# Patient Record
Sex: FEMALE | Race: WHITE | Employment: UNEMPLOYED | URBAN - METROPOLITAN AREA
[De-identification: names, ages, dates, MRNs, and addresses within clinical notes are randomized per-mention and may not be internally consistent; named-entity substitution may affect disease eponyms.]

---

## 2021-06-25 LAB
ABO, EXTERNAL RESULT: NORMAL
HEP B, EXTERNAL RESULT: NEGATIVE
HEPATITIS C ANTIBODY, EXTERNAL RESULT: NEGATIVE
HIV, EXTERNAL RESULT: NEGATIVE
RH FACTOR, EXTERNAL RESULT: POSITIVE
RPR, EXTERNAL RESULT: NONREACTIVE
RUBELLA TITER, EXTERNAL RESULT: NORMAL

## 2021-10-01 ENCOUNTER — HOSPITAL ENCOUNTER (OUTPATIENT)
Age: 36
Discharge: HOME | End: 2021-10-01
Attending: STUDENT IN AN ORGANIZED HEALTH CARE EDUCATION/TRAINING PROGRAM | Admitting: STUDENT IN AN ORGANIZED HEALTH CARE EDUCATION/TRAINING PROGRAM
Payer: MEDICAID

## 2021-10-01 VITALS
WEIGHT: 290 LBS | DIASTOLIC BLOOD PRESSURE: 60 MMHG | SYSTOLIC BLOOD PRESSURE: 116 MMHG | RESPIRATION RATE: 18 BRPM | HEIGHT: 66 IN | TEMPERATURE: 98.5 F | BODY MASS INDEX: 46.61 KG/M2 | HEART RATE: 86 BPM

## 2021-10-01 LAB
BILIRUBIN URINE: NEGATIVE
BLOOD, URINE: NEGATIVE
CLARITY: CLEAR
COLOR: YELLOW
EPITHELIAL CELLS, UA: NORMAL /HPF (ref 0–5)
GLUCOSE URINE: NEGATIVE MG/DL
KETONES, URINE: 40 MG/DL
LEUKOCYTE ESTERASE, URINE: ABNORMAL
MICROSCOPIC EXAMINATION: YES
NITRITE, URINE: NEGATIVE
PH UA: 6 (ref 5–8)
PROTEIN UA: NEGATIVE MG/DL
RBC UA: NORMAL /HPF (ref 0–4)
SPECIFIC GRAVITY UA: 1.02 (ref 1–1.03)
URINE TYPE: ABNORMAL
UROBILINOGEN, URINE: 0.2 E.U./DL
WBC UA: NORMAL /HPF (ref 0–5)

## 2021-10-01 PROCEDURE — 99211 OFF/OP EST MAY X REQ PHY/QHP: CPT

## 2021-10-01 PROCEDURE — 81001 URINALYSIS AUTO W/SCOPE: CPT

## 2021-10-01 NOTE — PROGRESS NOTES
Pt verbalized understanding of verbal and written discharge instructions and denies having questions at this time. Pt left OB unit at 1717 ambulatory, undelivered, and in stable condition, accompanied by FOB. Patient is not in active labor.

## 2021-10-01 NOTE — PROGRESS NOTES
This RN notified Dr Carmella Johns of pt arrival and complaints. Pt did state that she does not feel like she is emptying her bladder all the way.  Orders to send UA

## 2021-12-20 ENCOUNTER — HOSPITAL ENCOUNTER (OUTPATIENT)
Dept: ULTRASOUND IMAGING | Age: 36
Discharge: HOME | End: 2021-12-20
Payer: MEDICAID

## 2021-12-20 LAB — GBS, EXTERNAL RESULT: NEGATIVE

## 2021-12-20 PROCEDURE — 76815 OB US LIMITED FETUS(S): CPT

## 2021-12-29 ENCOUNTER — HOSPITAL ENCOUNTER (OUTPATIENT)
Age: 36
Discharge: HOME | End: 2021-12-29
Payer: MEDICAID

## 2021-12-29 PROCEDURE — U0005 INFEC AGEN DETEC AMPLI PROBE: HCPCS

## 2021-12-29 PROCEDURE — U0003 INFECTIOUS AGENT DETECTION BY NUCLEIC ACID (DNA OR RNA); SEVERE ACUTE RESPIRATORY SYNDROME CORONAVIRUS 2 (SARS-COV-2) (CORONAVIRUS DISEASE [COVID-19]), AMPLIFIED PROBE TECHNIQUE, MAKING USE OF HIGH THROUGHPUT TECHNOLOGIES AS DESCRIBED BY CMS-2020-01-R: HCPCS

## 2021-12-30 LAB — SARS-COV-2: NOT DETECTED

## 2022-01-22 ENCOUNTER — ANESTHESIA (OUTPATIENT)
Dept: LABOR AND DELIVERY | Age: 37
DRG: 560 | End: 2022-01-22
Payer: MEDICAID

## 2022-01-22 ENCOUNTER — HOSPITAL ENCOUNTER (INPATIENT)
Age: 37
LOS: 2 days | Discharge: HOME | DRG: 560 | End: 2022-01-24
Attending: OBSTETRICS & GYNECOLOGY | Admitting: OBSTETRICS & GYNECOLOGY
Payer: MEDICAID

## 2022-01-22 ENCOUNTER — APPOINTMENT (OUTPATIENT)
Dept: LABOR AND DELIVERY | Age: 37
DRG: 560 | End: 2022-01-22
Payer: MEDICAID

## 2022-01-22 ENCOUNTER — ANESTHESIA EVENT (OUTPATIENT)
Dept: LABOR AND DELIVERY | Age: 37
DRG: 560 | End: 2022-01-22
Payer: MEDICAID

## 2022-01-22 PROBLEM — O48.0 POST-DATES PREGNANCY: Status: ACTIVE | Noted: 2022-01-22

## 2022-01-22 PROBLEM — O48.0 POST-TERM PREGNANCY, 40-42 WEEKS OF GESTATION: Status: ACTIVE | Noted: 2022-01-22

## 2022-01-22 LAB
ABO/RH: NORMAL
AMPHETAMINE SCREEN, URINE: NORMAL
ANTIBODY SCREEN: NORMAL
BARBITURATE SCREEN URINE: NORMAL
BASOPHILS ABSOLUTE: 0 K/UL (ref 0–0.2)
BASOPHILS RELATIVE PERCENT: 0.3 %
BENZODIAZEPINE SCREEN, URINE: NORMAL
BUPRENORPHINE URINE: NORMAL
CANNABINOID SCREEN URINE: NORMAL
COCAINE METABOLITE SCREEN URINE: NORMAL
EOSINOPHILS ABSOLUTE: 0.1 K/UL (ref 0–0.6)
EOSINOPHILS RELATIVE PERCENT: 0.7 %
HCT VFR BLD CALC: 35.5 % (ref 36–48)
HEMOGLOBIN: 11.8 G/DL (ref 12–16)
LYMPHOCYTES ABSOLUTE: 1.1 K/UL (ref 1–5.1)
LYMPHOCYTES RELATIVE PERCENT: 12.2 %
Lab: NORMAL
MCH RBC QN AUTO: 30.5 PG (ref 26–34)
MCHC RBC AUTO-ENTMCNC: 33.3 G/DL (ref 31–36)
MCV RBC AUTO: 91.8 FL (ref 80–100)
METHADONE SCREEN, URINE: NORMAL
MONOCYTES ABSOLUTE: 0.6 K/UL (ref 0–1.3)
MONOCYTES RELATIVE PERCENT: 7.3 %
NEUTROPHILS ABSOLUTE: 6.9 K/UL (ref 1.7–7.7)
NEUTROPHILS RELATIVE PERCENT: 79.5 %
OPIATE SCREEN URINE: NORMAL
OXYCODONE URINE: NORMAL
PDW BLD-RTO: 16.3 % (ref 12.4–15.4)
PH UA: 6
PHENCYCLIDINE SCREEN URINE: NORMAL
PLATELET # BLD: 208 K/UL (ref 135–450)
PMV BLD AUTO: 10.2 FL (ref 5–10.5)
PROPOXYPHENE SCREEN: NORMAL
RBC # BLD: 3.86 M/UL (ref 4–5.2)
WBC # BLD: 8.7 K/UL (ref 4–11)

## 2022-01-22 PROCEDURE — 6370000000 HC RX 637 (ALT 250 FOR IP): Performed by: OBSTETRICS & GYNECOLOGY

## 2022-01-22 PROCEDURE — 51701 INSERT BLADDER CATHETER: CPT

## 2022-01-22 PROCEDURE — 86592 SYPHILIS TEST NON-TREP QUAL: CPT

## 2022-01-22 PROCEDURE — 85025 COMPLETE CBC W/AUTO DIFF WBC: CPT

## 2022-01-22 PROCEDURE — 80307 DRUG TEST PRSMV CHEM ANLYZR: CPT

## 2022-01-22 PROCEDURE — 86901 BLOOD TYPING SEROLOGIC RH(D): CPT

## 2022-01-22 PROCEDURE — 1220000000 HC SEMI PRIVATE OB R&B

## 2022-01-22 PROCEDURE — 2580000003 HC RX 258: Performed by: OBSTETRICS & GYNECOLOGY

## 2022-01-22 PROCEDURE — 3700000025 EPIDURAL BLOCK: Performed by: ANESTHESIOLOGY

## 2022-01-22 PROCEDURE — 86900 BLOOD TYPING SEROLOGIC ABO: CPT

## 2022-01-22 PROCEDURE — 86850 RBC ANTIBODY SCREEN: CPT

## 2022-01-22 PROCEDURE — 2500000003 HC RX 250 WO HCPCS: Performed by: NURSE ANESTHETIST, CERTIFIED REGISTERED

## 2022-01-22 PROCEDURE — 6370000000 HC RX 637 (ALT 250 FOR IP)

## 2022-01-22 RX ORDER — SODIUM CHLORIDE, SODIUM LACTATE, POTASSIUM CHLORIDE, CALCIUM CHLORIDE 600; 310; 30; 20 MG/100ML; MG/100ML; MG/100ML; MG/100ML
INJECTION, SOLUTION INTRAVENOUS CONTINUOUS
Status: DISCONTINUED | OUTPATIENT
Start: 2022-01-22 | End: 2022-01-23

## 2022-01-22 RX ORDER — SODIUM CHLORIDE, SODIUM LACTATE, POTASSIUM CHLORIDE, AND CALCIUM CHLORIDE .6; .31; .03; .02 G/100ML; G/100ML; G/100ML; G/100ML
1000 INJECTION, SOLUTION INTRAVENOUS PRN
Status: DISCONTINUED | OUTPATIENT
Start: 2022-01-22 | End: 2022-01-23

## 2022-01-22 RX ORDER — LIDOCAINE HYDROCHLORIDE 10 MG/ML
30 INJECTION, SOLUTION EPIDURAL; INFILTRATION; INTRACAUDAL; PERINEURAL PRN
Status: DISCONTINUED | OUTPATIENT
Start: 2022-01-22 | End: 2022-01-23

## 2022-01-22 RX ORDER — SODIUM CHLORIDE 9 MG/ML
25 INJECTION, SOLUTION INTRAVENOUS PRN
Status: DISCONTINUED | OUTPATIENT
Start: 2022-01-22 | End: 2022-01-23

## 2022-01-22 RX ORDER — SODIUM CHLORIDE, SODIUM LACTATE, POTASSIUM CHLORIDE, AND CALCIUM CHLORIDE .6; .31; .03; .02 G/100ML; G/100ML; G/100ML; G/100ML
500 INJECTION, SOLUTION INTRAVENOUS PRN
Status: DISCONTINUED | OUTPATIENT
Start: 2022-01-22 | End: 2022-01-23

## 2022-01-22 RX ORDER — SODIUM CHLORIDE, SODIUM LACTATE, POTASSIUM CHLORIDE, CALCIUM CHLORIDE 600; 310; 30; 20 MG/100ML; MG/100ML; MG/100ML; MG/100ML
INJECTION, SOLUTION INTRAVENOUS CONTINUOUS
Status: DISCONTINUED | OUTPATIENT
Start: 2022-01-22 | End: 2022-01-22

## 2022-01-22 RX ORDER — LIDOCAINE HYDROCHLORIDE AND EPINEPHRINE BITARTRATE 20; .01 MG/ML; MG/ML
INJECTION, SOLUTION SUBCUTANEOUS PRN
Status: DISCONTINUED | OUTPATIENT
Start: 2022-01-22 | End: 2022-01-22 | Stop reason: SDUPTHER

## 2022-01-22 RX ORDER — DIPHENHYDRAMINE HYDROCHLORIDE 50 MG/ML
25 INJECTION INTRAMUSCULAR; INTRAVENOUS EVERY 4 HOURS PRN
Status: DISCONTINUED | OUTPATIENT
Start: 2022-01-22 | End: 2022-01-23

## 2022-01-22 RX ORDER — BUPIVACAINE HYDROCHLORIDE 2.5 MG/ML
INJECTION, SOLUTION EPIDURAL; INFILTRATION; INTRACAUDAL PRN
Status: DISCONTINUED | OUTPATIENT
Start: 2022-01-22 | End: 2022-01-22 | Stop reason: SDUPTHER

## 2022-01-22 RX ORDER — SODIUM CHLORIDE 0.9 % (FLUSH) 0.9 %
5-40 SYRINGE (ML) INJECTION PRN
Status: DISCONTINUED | OUTPATIENT
Start: 2022-01-22 | End: 2022-01-23

## 2022-01-22 RX ORDER — ONDANSETRON 2 MG/ML
4 INJECTION INTRAMUSCULAR; INTRAVENOUS EVERY 6 HOURS PRN
Status: CANCELLED | OUTPATIENT
Start: 2022-01-22

## 2022-01-22 RX ORDER — ACETAMINOPHEN 325 MG/1
650 TABLET ORAL EVERY 4 HOURS PRN
Status: DISCONTINUED | OUTPATIENT
Start: 2022-01-22 | End: 2022-01-23

## 2022-01-22 RX ORDER — SODIUM CHLORIDE 0.9 % (FLUSH) 0.9 %
5-40 SYRINGE (ML) INJECTION EVERY 12 HOURS SCHEDULED
Status: DISCONTINUED | OUTPATIENT
Start: 2022-01-22 | End: 2022-01-23

## 2022-01-22 RX ORDER — BUTORPHANOL TARTRATE 1 MG/ML
1 INJECTION, SOLUTION INTRAMUSCULAR; INTRAVENOUS
Status: DISCONTINUED | OUTPATIENT
Start: 2022-01-22 | End: 2022-01-23

## 2022-01-22 RX ORDER — ONDANSETRON 2 MG/ML
4 INJECTION INTRAMUSCULAR; INTRAVENOUS EVERY 6 HOURS PRN
Status: DISCONTINUED | OUTPATIENT
Start: 2022-01-22 | End: 2022-01-23

## 2022-01-22 RX ADMIN — BUPIVACAINE HYDROCHLORIDE 8 MG: 2.5 INJECTION, SOLUTION EPIDURAL; INFILTRATION; INTRACAUDAL; PERINEURAL at 18:37

## 2022-01-22 RX ADMIN — SODIUM CHLORIDE, POTASSIUM CHLORIDE, SODIUM LACTATE AND CALCIUM CHLORIDE 1000 ML: 600; 310; 30; 20 INJECTION, SOLUTION INTRAVENOUS at 18:27

## 2022-01-22 RX ADMIN — Medication 500 ML: at 23:09

## 2022-01-22 RX ADMIN — Medication 25 MCG: at 14:21

## 2022-01-22 RX ADMIN — Medication 25 MCG: at 09:45

## 2022-01-22 RX ADMIN — LIDOCAINE HYDROCHLORIDE AND EPINEPHRINE 3 ML: 20; 10 INJECTION, SOLUTION INFILTRATION; PERINEURAL at 18:33

## 2022-01-22 RX ADMIN — SODIUM CHLORIDE, POTASSIUM CHLORIDE, SODIUM LACTATE AND CALCIUM CHLORIDE: 600; 310; 30; 20 INJECTION, SOLUTION INTRAVENOUS at 19:28

## 2022-01-22 RX ADMIN — SODIUM CHLORIDE, POTASSIUM CHLORIDE, SODIUM LACTATE AND CALCIUM CHLORIDE: 600; 310; 30; 20 INJECTION, SOLUTION INTRAVENOUS at 20:53

## 2022-01-22 RX ADMIN — SODIUM CHLORIDE, POTASSIUM CHLORIDE, SODIUM LACTATE AND CALCIUM CHLORIDE 1000 ML: 600; 310; 30; 20 INJECTION, SOLUTION INTRAVENOUS at 18:35

## 2022-01-22 RX ADMIN — Medication 15 ML/HR: at 19:22

## 2022-01-22 NOTE — H&P
Department of Obstetrics and Gynecology   Obstetrics History and Physical        CHIEF COMPLAINT:  IOL postdates    HISTORY OF PRESENT ILLNESS:      The patient is a 29 y.o. female at 44w3d. OB History        4    Para   3    Term   3       0    AB   0    Living   4       SAB   0    IAB   0    Ectopic   0    Molar   0    Multiple   1    Live Births   4            Patient presents with a chief complaint as above and is being admitted for induction    Estimated Due Date: Estimated Date of Delivery: 22    PRENATAL CARE:    Complicated by: post-dates    PAST OB HISTORY:  OB History        4    Para   3    Term   3       0    AB   0    Living   4       SAB   0    IAB   0    Ectopic   0    Molar   0    Multiple   1    Live Births   4                Past Medical History:        Diagnosis Date    Skin sore     pt has raised \"boils\" on abdomen, states not MRSA, one is reddened, all others are scarred    Thyroid disease      Past Surgical History:    History reviewed. No pertinent surgical history.   Allergies:  Bactrim [sulfamethoxazole-trimethoprim] and Flagyl [metronidazole]    Social History:    Social History     Socioeconomic History    Marital status:      Spouse name: Not on file    Number of children: Not on file    Years of education: Not on file    Highest education level: Not on file   Occupational History    Not on file   Tobacco Use    Smoking status: Former Smoker     Packs/day: 1.00     Years: 9.00     Pack years: 9.00     Types: Cigarettes    Smokeless tobacco: Never Used   Vaping Use    Vaping Use: Not on file   Substance and Sexual Activity    Alcohol use: No    Drug use: No    Sexual activity: Yes     Partners: Male   Other Topics Concern    Not on file   Social History Narrative    Not on file     Social Determinants of Health     Financial Resource Strain:     Difficulty of Paying Living Expenses: Not on file   Food Insecurity:     Worried About Running Out of Food in the Last Year: Not on file    Ran Out of Food in the Last Year: Not on file   Transportation Needs:     Lack of Transportation (Medical): Not on file    Lack of Transportation (Non-Medical): Not on file   Physical Activity:     Days of Exercise per Week: Not on file    Minutes of Exercise per Session: Not on file   Stress:     Feeling of Stress : Not on file   Social Connections:     Frequency of Communication with Friends and Family: Not on file    Frequency of Social Gatherings with Friends and Family: Not on file    Attends Evangelical Services: Not on file    Active Member of [address] [place] [address] [employer] or Organizations: Not on file    Attends Club or Organization Meetings: Not on file    Marital Status: Not on file   Intimate Partner Violence:     Fear of Current or Ex-Partner: Not on file    Emotionally Abused: Not on file    Physically Abused: Not on file    Sexually Abused: Not on file   Housing Stability:     Unable to Pay for Housing in the Last Year: Not on file    Number of [place] in the Last Year: Not on file    Unstable Housing in the Last Year: Not on file     Family History:   History reviewed. No pertinent family history. Medications Prior to Admission:  Medications Prior to Admission: Prenatal MV-Min-Fe Fum-FA-DHA (PRENATAL 1 PO), Take by mouth  levothyroxine (SYNTHROID) 88 MCG tablet, Take 150 mcg by mouth Daily     REVIEW OF SYSTEMS:    wnl    PHYSICAL EXAM:  Vitals:    01/22/22 0906   Weight: (!) 308 lb (139.7 kg)   Height: 5' 6\" (1.676 m)     General appearance:  awake, alert, cooperative, no apparent distress, and appears stated age  Neurologic:  Awake, alert, oriented to name, place and time. Lungs:  No increased work of breathing, good air exchange  Abdomen:  Soft, non tender, gravid, consistent with her gestational age, EFW by Leopold's maneuver was 8#   Fetal heart rate:  Reassuring.   Pelvis:  Adequate pelvis  Cervix: 2-3/50%/-2  Contraction frequency: irregular  Membranes:  Intact    Labs:   CBC with Differential:    Lab Results   Component Value Date    WBC 8.7 01/22/2022    RBC 3.86 01/22/2022    HGB 11.8 01/22/2022    HCT 35.5 01/22/2022     01/22/2022    MCV 91.8 01/22/2022    MCH 30.5 01/22/2022    MCHC 33.3 01/22/2022    RDW 16.3 01/22/2022    LYMPHOPCT 12.2 01/22/2022    MONOPCT 7.3 01/22/2022    BASOPCT 0.3 01/22/2022    MONOSABS 0.6 01/22/2022    LYMPHSABS 1.1 01/22/2022    EOSABS 0.1 01/22/2022    BASOSABS 0.0 01/22/2022       ASSESSMENT AND PLAN:    Labor: Admit, anticipate normal delivery, routine labor orders  Fetus: Reassuring  GBS: No    Post-dates IOL  Cytotec  NCB-expect uncomplicated L and D    MYESHA Flores

## 2022-01-23 LAB
HCT VFR BLD CALC: 32.6 % (ref 36–48)
HEMOGLOBIN: 10.6 G/DL (ref 12–16)
MCH RBC QN AUTO: 31.1 PG (ref 26–34)
MCHC RBC AUTO-ENTMCNC: 32.6 G/DL (ref 31–36)
MCV RBC AUTO: 95.3 FL (ref 80–100)
PDW BLD-RTO: 16.7 % (ref 12.4–15.4)
PLATELET # BLD: 186 K/UL (ref 135–450)
PMV BLD AUTO: 9.2 FL (ref 5–10.5)
RBC # BLD: 3.43 M/UL (ref 4–5.2)
RPR: NORMAL
WBC # BLD: 8.1 K/UL (ref 4–11)

## 2022-01-23 PROCEDURE — 10907ZC DRAINAGE OF AMNIOTIC FLUID, THERAPEUTIC FROM PRODUCTS OF CONCEPTION, VIA NATURAL OR ARTIFICIAL OPENING: ICD-10-PCS | Performed by: OBSTETRICS & GYNECOLOGY

## 2022-01-23 PROCEDURE — 85027 COMPLETE CBC AUTOMATED: CPT

## 2022-01-23 PROCEDURE — 7200000001 HC VAGINAL DELIVERY

## 2022-01-23 PROCEDURE — 6370000000 HC RX 637 (ALT 250 FOR IP): Performed by: OBSTETRICS & GYNECOLOGY

## 2022-01-23 PROCEDURE — 6360000002 HC RX W HCPCS: Performed by: OBSTETRICS & GYNECOLOGY

## 2022-01-23 PROCEDURE — 3E0DXGC INTRODUCTION OF OTHER THERAPEUTIC SUBSTANCE INTO MOUTH AND PHARYNX, EXTERNAL APPROACH: ICD-10-PCS | Performed by: OBSTETRICS & GYNECOLOGY

## 2022-01-23 PROCEDURE — 1220000000 HC SEMI PRIVATE OB R&B

## 2022-01-23 PROCEDURE — 36415 COLL VENOUS BLD VENIPUNCTURE: CPT

## 2022-01-23 RX ORDER — SIMETHICONE 80 MG
80 TABLET,CHEWABLE ORAL EVERY 6 HOURS PRN
Status: DISCONTINUED | OUTPATIENT
Start: 2022-01-23 | End: 2022-01-24 | Stop reason: HOSPADM

## 2022-01-23 RX ORDER — SODIUM CHLORIDE 9 MG/ML
25 INJECTION, SOLUTION INTRAVENOUS PRN
Status: DISCONTINUED | OUTPATIENT
Start: 2022-01-23 | End: 2022-01-24 | Stop reason: HOSPADM

## 2022-01-23 RX ORDER — SODIUM CHLORIDE 0.9 % (FLUSH) 0.9 %
5-40 SYRINGE (ML) INJECTION EVERY 12 HOURS SCHEDULED
Status: DISCONTINUED | OUTPATIENT
Start: 2022-01-23 | End: 2022-01-24 | Stop reason: HOSPADM

## 2022-01-23 RX ORDER — IBUPROFEN 800 MG/1
800 TABLET ORAL EVERY 6 HOURS PRN
Status: DISCONTINUED | OUTPATIENT
Start: 2022-01-23 | End: 2022-01-24 | Stop reason: HOSPADM

## 2022-01-23 RX ORDER — LANOLIN 100 %
OINTMENT (GRAM) TOPICAL PRN
Status: DISCONTINUED | OUTPATIENT
Start: 2022-01-23 | End: 2022-01-24 | Stop reason: HOSPADM

## 2022-01-23 RX ORDER — ACETAMINOPHEN 325 MG/1
650 TABLET ORAL EVERY 4 HOURS PRN
Status: DISCONTINUED | OUTPATIENT
Start: 2022-01-23 | End: 2022-01-24 | Stop reason: HOSPADM

## 2022-01-23 RX ORDER — SODIUM CHLORIDE, SODIUM LACTATE, POTASSIUM CHLORIDE, CALCIUM CHLORIDE 600; 310; 30; 20 MG/100ML; MG/100ML; MG/100ML; MG/100ML
INJECTION, SOLUTION INTRAVENOUS CONTINUOUS
Status: DISCONTINUED | OUTPATIENT
Start: 2022-01-23 | End: 2022-01-24 | Stop reason: HOSPADM

## 2022-01-23 RX ORDER — DOCUSATE SODIUM 100 MG/1
100 CAPSULE, LIQUID FILLED ORAL 2 TIMES DAILY PRN
Status: DISCONTINUED | OUTPATIENT
Start: 2022-01-23 | End: 2022-01-24 | Stop reason: HOSPADM

## 2022-01-23 RX ORDER — FERROUS SULFATE 325(65) MG
325 TABLET ORAL 2 TIMES DAILY WITH MEALS
Status: DISCONTINUED | OUTPATIENT
Start: 2022-01-23 | End: 2022-01-24 | Stop reason: HOSPADM

## 2022-01-23 RX ORDER — ONDANSETRON 2 MG/ML
4 INJECTION INTRAMUSCULAR; INTRAVENOUS EVERY 6 HOURS PRN
Status: DISCONTINUED | OUTPATIENT
Start: 2022-01-23 | End: 2022-01-24 | Stop reason: HOSPADM

## 2022-01-23 RX ORDER — SODIUM CHLORIDE 0.9 % (FLUSH) 0.9 %
5-40 SYRINGE (ML) INJECTION PRN
Status: DISCONTINUED | OUTPATIENT
Start: 2022-01-23 | End: 2022-01-24 | Stop reason: HOSPADM

## 2022-01-23 RX ORDER — KETOROLAC TROMETHAMINE 30 MG/ML
30 INJECTION, SOLUTION INTRAMUSCULAR; INTRAVENOUS EVERY 6 HOURS
Status: DISPENSED | OUTPATIENT
Start: 2022-01-23 | End: 2022-01-23

## 2022-01-23 RX ADMIN — ACETAMINOPHEN 650 MG: 325 TABLET ORAL at 15:47

## 2022-01-23 RX ADMIN — KETOROLAC TROMETHAMINE 30 MG: 30 INJECTION, SOLUTION INTRAMUSCULAR at 05:33

## 2022-01-23 RX ADMIN — DOCUSATE SODIUM 100 MG: 100 CAPSULE ORAL at 08:53

## 2022-01-23 RX ADMIN — IBUPROFEN 800 MG: 800 TABLET, FILM COATED ORAL at 23:10

## 2022-01-23 RX ADMIN — IBUPROFEN 800 MG: 800 TABLET, FILM COATED ORAL at 11:37

## 2022-01-23 RX ADMIN — ACETAMINOPHEN 650 MG: 325 TABLET ORAL at 08:53

## 2022-01-23 ASSESSMENT — PAIN SCALES - GENERAL
PAINLEVEL_OUTOF10: 3
PAINLEVEL_OUTOF10: 3

## 2022-01-23 NOTE — ANESTHESIA POSTPROCEDURE EVALUATION
Department of Anesthesiology  Postprocedure Note    Patient: Sissy Lino  MRN: [id number]  YOB: 1985  Date of evaluation: 1/23/2022  Time:  2:50 PM     Procedure Summary     Date: 01/22/22 Room / Location:     Anesthesia Start: 1825 Anesthesia Stop: 2306    Procedure: Labor Analgesia Diagnosis:     Scheduled Providers:  Responsible Provider: Nel Hoffman MD    Anesthesia Type: epidural ASA Status: 3          Anesthesia Type: epidural    Darell Phase I: Darell Score: 9    Darell Phase II: Darell Score: 10    Last vitals: Reviewed and per EMR flowsheets.        Anesthesia Post Evaluation    Patient location during evaluation: bedside  Patient participation: complete - patient participated  Level of consciousness: awake and alert  Nausea & Vomiting: no nausea and no vomiting  Complications: no  Cardiovascular status: hemodynamically stable  Hydration status: stable

## 2022-01-23 NOTE — ANESTHESIA PRE PROCEDURE
Department of Anesthesiology  Preprocedure Note       Name:  Kianna Gustafson   Age:  29 y.o.  :  1985                                          MRN:  [id number]         Date:  2022      Surgeon: Dr. Andreia Workman  Procedure: PHILLIP  Medications prior to admission:   Prior to Admission medications    Medication Sig Start Date End Date Taking?  Authorizing Provider   Prenatal MV-Min-Fe Fum-FA-DHA (PRENATAL 1 PO) Take by mouth    Historical Provider, MD   levothyroxine (SYNTHROID) 88 MCG tablet Take 150 mcg by mouth Daily     Historical Provider, MD       Current medications:    Current Facility-Administered Medications   Medication Dose Route Frequency Provider Last Rate Last Admin    lactated ringers infusion   IntraVENous Continuous Nilton Larkin MD        lactated ringers infusion   IntraVENous Continuous Nilton Larkin MD        lactated ringers bolus  500 mL IntraVENous PRN Nilton Larkin MD        Or    lactated ringers bolus  1,000 mL IntraVENous PRN Nilton Larkin MD        sodium chloride flush 0.9 % injection 5-40 mL  5-40 mL IntraVENous 2 times per day Nilton Larkin MD        sodium chloride flush 0.9 % injection 5-40 mL  5-40 mL IntraVENous PRN Nilton Larkin MD        0.9 % sodium chloride infusion  25 mL IntraVENous PRN Nilton Larkin MD        oxytocin (PITOCIN) 30 units in 500 mL infusion  87.3 lamine-units/min IntraVENous Continuous PRKEEGAN Larkin MD        And    oxytocin (PITOCIN) 10 unit bolus from the bag  10 Units IntraVENous RYAN Larkin MD        lactated ringers bolus  500 mL IntraVENous PRN Nilton Larkin MD        Or    lactated ringers bolus  1,000 mL IntraVENous PRN Nilton Larkin  mL/hr at 22 1835 1,000 mL at 22    sodium chloride flush 0.9 % injection 5-40 mL  5-40 mL IntraVENous 2 times per day Nilton Larkin MD        sodium chloride flush 0.9 % injection 5-40 mL 5-40 mL IntraVENous PRN Vinnie Waterman MD        0.9 % sodium chloride infusion  25 mL IntraVENous PRN Vinnie Waterman MD        lidocaine PF 1 % injection 30 mL  30 mL Other PRN Vinnie Waterman MD        ondansetron [facility] injection 4 mg  4 mg IntraVENous Q6H PRN Vinnie Waterman MD        diphenhydrAMINE (BENADRYL) injection 25 mg  25 mg IntraVENous Q4H PRN Vinnie Waterman MD        acetaminophen (TYLENOL) tablet 650 mg  650 mg Oral Q4H PRN Vinnie Waterman MD        miSOPROStol (CYTOTEC) pre-split tablet TABS 25 mcg  25 mcg Vaginal Q4H Vinnie Waterman MD   25 mcg at 01/22/22 1421    butorphanol (STADOL) injection 1 mg  1 mg IntraVENous Q3H PRN Vinnie Waterman MD        sodium chloride 0.9 % 200 mL with fentaNYL 500 mcg, bupivacaine 0.5% 50 mL (OB) epidural                Allergies: Allergies   Allergen Reactions    Bactrim [Sulfamethoxazole-Trimethoprim] Rash    Flagyl [Metronidazole] Rash       Problem List:    Patient Active Problem List   Diagnosis Code    Right knee sprain S83. 91XA    Patellar subluxation S83.003A    Post-term pregnancy, 40-42 weeks of gestation O46.0    Post-dates pregnancy O48.0       Past Medical History:        Diagnosis Date    Fibroid uterus     8 cm, @ cervix    Skin sore     pt has raised \"boils\" on abdomen, states not MRSA, one is reddened, all others are scarred    Thyroid disease        Past Surgical History:  History reviewed. No pertinent surgical history. Social History:    Social History     Tobacco Use    Smoking status: Former Smoker     Packs/day: 1.00     Years: 9.00     Pack years: 9.00     Types: Cigarettes    Smokeless tobacco: Never Used   Substance Use Topics    Alcohol use:  No                                Counseling given: Not Answered      Vital Signs (Current):   Vitals:    01/22/22 1835 01/22/22 1911 01/22/22 1914 01/22/22 1918   BP: (!) 195/93 125/67 126/61 (!) 150/71   Pulse: 76 70 66 68   Resp: Temp:       TempSrc:       SpO2:       Weight:       Height:                                                  BP Readings from Last 3 Encounters:   01/22/22 (!) 150/71   10/01/21 116/60   02/19/18 133/74       NPO Status: Time of last liquid consumption: 0907                        Time of last solid consumption: 0600                        Date of last liquid consumption: 01/22/22                        Date of last solid food consumption: 01/22/22    BMI:   Wt Readings from Last 3 Encounters:   01/22/22 (!) 308 lb (139.7 kg)   10/01/21 290 lb (131.5 kg)   02/19/18 275 lb (124.7 kg)     Body mass index is 49.71 kg/m².     CBC:   Lab Results   Component Value Date    WBC 8.7 01/22/2022    RBC 3.86 01/22/2022    HGB 11.8 01/22/2022    HCT 35.5 01/22/2022    MCV 91.8 01/22/2022    RDW 16.3 01/22/2022     01/22/2022       CMP:   Lab Results   Component Value Date     02/19/2018    K 3.8 02/19/2018     02/19/2018    CO2 24 02/19/2018    BUN 14 02/19/2018    CREATININE 0.7 02/19/2018    GFRAA >60 02/19/2018    AGRATIO 1.4 02/19/2018    LABGLOM >60 02/19/2018    GLUCOSE 96 02/19/2018    PROT 7.0 02/19/2018    CALCIUM 8.8 02/19/2018    BILITOT <0.2 02/19/2018    ALKPHOS 54 02/19/2018    AST 16 02/19/2018    ALT 20 02/19/2018       A POS    COVID-19 Screening (If Applicable):   Lab Results   Component Value Date    COVID19 Not Detected 12/29/2021           Anesthesia Evaluation  Patient summary reviewed and Nursing notes reviewed no history of anesthetic complications:   Airway: Mallampati: II        Dental: normal exam         Pulmonary:Negative Pulmonary ROS and normal exam  breath sounds clear to auscultation                             Cardiovascular:Negative CV ROS                      Neuro/Psych:   Negative Neuro/Psych ROS              GI/Hepatic/Renal: Neg GI/Hepatic/Renal ROS            Endo/Other:    (+) hypothyroidism::., .                  ROS comment: Morbid obesity Abdominal:   (+) obese,            PE comment: gravid   Vascular: negative vascular ROS. Other Findings:             Anesthesia Plan      epidural     ASA 3             Anesthetic plan and risks discussed with patient. Use of blood products discussed with patient whom consented to blood products. THERESE Romero CRNA   2022    OB History        4    Para   3    Term   3       0    AB   0    Living   4       SAB   0    IAB   0    Ectopic   0    Molar   0    Multiple   1    Live Births   Martha is a 29y.o. year-old female admitted to [facility] for labor. Gestational age is 44w3d. Her Body mass index is 49.71 kg/m². She was seen, examined and her chart was reviewed (including anesthesia, drug and allergy history). No interval changes are noted to her history and physical examination. (except as noted above).     Risks/benefits/alternatives of both neuraxial and general anesthesia were discussed and she agrees to proceed at the direction of the care team.    Performed at 18:20    THERESE Romero CRNA  2022  7:21 PM

## 2022-01-23 NOTE — ANESTHESIA PROCEDURE NOTES
Epidural Block    Patient location during procedure: OB  Start time: 1/22/2022 6:25 PM  End time: 1/22/2022 6:45 PM  Reason for block: labor epidural  Staffing  Performed: resident/CRNA   Resident/CRNA: THERESE Geiger CRNA  Preanesthetic Checklist  Completed: patient identified, IV checked, site marked, risks and benefits discussed, surgical consent, monitors and equipment checked, pre-op evaluation, timeout performed, anesthesia consent given, oxygen available and patient being monitored  Epidural  Patient position: sitting  Prep: ChloraPrep  Patient monitoring: frequent blood pressure checks and continuous pulse ox  Approach: midline  Location: lumbar (1-5)  Injection technique: TESS saline  Guidance: paresthesia technique  Needle  Needle type: Tuohy   Needle gauge: 17 G  Needle length: 3.5 in  Needle insertion depth: 7.5 cm  Catheter type: side hole  Catheter size: 20g.   Catheter at skin depth: 15 cm  Test dose: negative  Assessment  Sensory level: T8  Hemodynamics: stable  Attempts: 1

## 2022-01-23 NOTE — PROGRESS NOTES
Spoke with Dr. Vandana Barbosa regarding pt status, FHR with late decelerations post epidural placement, contraction pattern and unable to  when laying far right side. Telephone order to bolus bag #3 and SVE. States will be in unit later for AROM.

## 2022-01-23 NOTE — L&D DELIVERY SUMMARY NOTE
[address] [address]                            LABOR AND DELIVERY NOTE    PATIENT NAME: Cailin Muniz                :        1985  MED REC NO:   [id number]                          ROOM:       8644  ACCOUNT NO:   [de-identified]                           ADMIT DATE: 2022  PROVIDER:     Smita Washington MD    DATE OF PROCEDURE:  2022    36-year-old  4, para 3 with four living who presented at 40  weeks 3 days for induction of labor with a favorable cervix and  postdates. Prenatal course was otherwise uncomplicated. Group B beta  strep culture was negative. MT tracing was reassuring. The patient did receive Cytotec x 2. The patient requested and received  an epidural.  Amniotomy was performed for clear fluid. The patient  progressed to second stage and after a 10 minutes second stage delivered  by spontaneous vaginal delivery of a viable male infant. Apgars were 8  at one minute and 8 at five minutes. No episiotomy was performed and no  lacerations occurred with delivery. Placenta delivered spontaneously  grossly intact with a three-vessel cord. Estimated blood loss was 100  mL. The patient named her little boy [place].         Vikash Obregon MD    D: 2022 23:18:31       T: 2022 23:20:57     [email or web address]  Job#: [id number]     Doc#: [id number]    CC:

## 2022-01-23 NOTE — PROGRESS NOTES
[facility]  Labor and Delivery   Post Partum Progress Note      SUBJECTIVE:  PPD 1 s/p  male    OBJECTIVE:      Vitals:  Vitals:    22 0844   BP: (!) 115/57   Pulse: 79   Resp: 18   Temp: 97.7 °F (36.5 °C)   SpO2:         Fundus firm, normal lochia  Extremities normal      DATA:    CBC:    Lab Results   Component Value Date    WBC 8.1 2022    RBC 3.43 2022    HGB 10.6 2022    HCT 32.6 2022    MCV 95.3 2022    RDW 16.7 2022     2022       ASSESSMENT & PLAN:      Doing well  Desires circ for male infant-consented. Probable home tomorrow.     Jacob Seals

## 2022-01-23 NOTE — PROGRESS NOTES
S/p epidural  FHT Cat 2 s/p epidural  Ctxs q 2 min  Cx 4/70%/-2-AROM clear     Position changes  IV fluids  Close fetal monitoring    MYESHA Abreu

## 2022-01-23 NOTE — BRIEF OP NOTE
Brief Postoperative Note      Patient: Leola Vance  YOB: 1985  MRN: [id number]    Date of Procedure:     , VMI  Apgars 8/8  No epis/lac  Placenta spon   cc  \"Jun\"    Electronically signed by Laci Godoy MD on 2022 at 11:15 PM

## 2022-01-24 VITALS
HEIGHT: 66 IN | DIASTOLIC BLOOD PRESSURE: 58 MMHG | BODY MASS INDEX: 47.09 KG/M2 | WEIGHT: 293 LBS | OXYGEN SATURATION: 100 % | SYSTOLIC BLOOD PRESSURE: 135 MMHG | HEART RATE: 85 BPM | RESPIRATION RATE: 18 BRPM | TEMPERATURE: 98 F

## 2022-01-24 PROCEDURE — 6370000000 HC RX 637 (ALT 250 FOR IP): Performed by: OBSTETRICS & GYNECOLOGY

## 2022-01-24 RX ADMIN — DOCUSATE SODIUM 100 MG: 100 CAPSULE ORAL at 10:38

## 2022-01-24 RX ADMIN — IBUPROFEN 800 MG: 800 TABLET, FILM COATED ORAL at 06:37

## 2022-01-24 RX ADMIN — ACETAMINOPHEN 650 MG: 325 TABLET ORAL at 10:37

## 2022-01-24 ASSESSMENT — PAIN SCALES - GENERAL: PAINLEVEL_OUTOF10: 3

## 2022-01-24 NOTE — FLOWSHEET NOTE
Postpartum and infant care teaching completed and forms signed by patient. Copy witnessed by RN and given to patient. Patient verbalized understanding of all teaching points and denies further questions. Patient plans to follow-up with [facility] Provider as instructed. ID bands checked. Father's ID band and one of baby's ID bands removed and taped to discharge instruction sheet, signed by patient and witnessed by RN. Patient discharged home in stable condition accompanied by fob. Discharged via wheelchair, holding baby in a rear facing car seat.

## 2022-01-24 NOTE — FLOWSHEET NOTE
Discharge Phone Call Log  Patient Name: Alfred Myrick     [facility] Care Provider: Ye Yoo MD Discharge Date: 2022    Disposition of baby:    Phone Number: [phone] (home)     Attempts to Contact:  Date:    Nurse  Date:    Nurse  Date:    Nurse    1. Now that you are at home is your pain being well controlled? Y/N   What pain reducing measures are you using? ____________________________________        Information for the patient's :  Jaswinder Price [[id number]]   Delivery Method: Vaginal, Spontaneous     2. Are you currently  having any infant feeding issues? Y/N _____________________________ If yes, please explain: __________________________________________________________________  3. If breastfeeding, were you satisfied with the breastfeeding support services offered? Y/N  4.  Have you had to supplement? Y/N If yes, please explain: _____________________________________________________       Did you supplement while in the hospital, or begin formula supplementation at home?________________________________________  5. Did your OB provider offer you information about the benefits of breastfeeding during your prenatal visits? Y/N  6.  Have you made or have you already had your first appointment with the baby's doctor? Y/N If no, do you know when to schedule it? Y/N   7.  Have you scheduled your follow-up appointment? Y/N  If no, do you know when to schedule it? Y/N  8. Did staff discuss safe sleep during your stay? Y/N  Did you see the wall cling posted in your room explaining how to keep you and your baby safe? Y/N  10. Did your nurses and physicians include you in the plan of care, communicating with you respectfully? Y/N If no, please explain __________________________  11. Is there anyone in particular you would like to mention who provided care for you? ________________________________  12. Did your discharge occur in a timely manner?   Y/N If no, please explain __________________________  13. Do you have any other questions or concerns I can address today?  Y/N  __________________________________________________      Teaching During interview :_____________________________________________  ___________________________RN       Date:______________Time:________________

## 2022-01-24 NOTE — PROGRESS NOTES
[facility] and Delivery   Post Partum Progress Note      SUBJECTIVE: PPD #2 s/p  male   Doing well       OBJECTIVE:      Vitals:  Vitals:    22 0635   BP: 123/68   Pulse: 68   Resp: 18   Temp: 97.6 °F (36.4 °C)   SpO2:         Fundus firm, normal lochia  Extremities normal      DATA:    CBC:    Lab Results   Component Value Date    WBC 8.1 2022    RBC 3.43 2022    HGB 10.6 2022    HCT 32.6 2022    MCV 95.3 2022    RDW 16.7 2022     2022       ASSESSMENT & PLAN:      PPD #2 s/p  male   Dc home today, declines motrin script, fu 6 weeks for pp visit     Sofia Dawson MD

## 2022-01-24 NOTE — DISCHARGE SUMMARY
Obstetrical Discharge Form    Gestational Age:40w3d    Antepartum complications: none    Date of Delivery: 22    Type of Delivery: vaginal, spontaneous    Delivered By:  Dr Kayla Duque:   Information for the patient's :  Melarturo Torres [[id number]]        Anesthesia: Epidural    Intrapartum complications: None    Postpartum complications: none    Discharge Medication:      Medication List      ASK your doctor about these medications    levothyroxine 88 MCG tablet  Commonly known as: SYNTHROID     PRENATAL 1 PO             Discharge Date: 22    Condition on discharge: Stable    Plan:   Follow up in 6 week(s)  Reviewed discharge instructions and scripts     Orly Bhatt MD

## 2022-08-08 LAB
C. TRACHOMATIS, EXTERNAL RESULT: NEGATIVE
N. GONORRHOEAE, EXTERNAL RESULT: NEGATIVE

## 2022-08-22 LAB
ABO, EXTERNAL RESULT: NORMAL
HEP B, EXTERNAL RESULT: NEGATIVE
HEPATITIS C ANTIBODY, EXTERNAL RESULT: NEGATIVE
HIV, EXTERNAL RESULT: NON REACTIVE
RH FACTOR, EXTERNAL RESULT: POSITIVE
RPR, EXTERNAL RESULT: NON REACTIVE
RUBELLA TITER, EXTERNAL RESULT: NORMAL

## 2022-09-09 ENCOUNTER — HOSPITAL ENCOUNTER (EMERGENCY)
Age: 37
Discharge: HOME | End: 2022-09-09
Payer: MEDICAID

## 2022-09-09 ENCOUNTER — APPOINTMENT (OUTPATIENT)
Dept: ULTRASOUND IMAGING | Age: 37
End: 2022-09-09
Payer: MEDICAID

## 2022-09-09 VITALS
TEMPERATURE: 98.4 F | HEIGHT: 66 IN | RESPIRATION RATE: 17 BRPM | DIASTOLIC BLOOD PRESSURE: 96 MMHG | OXYGEN SATURATION: 96 % | SYSTOLIC BLOOD PRESSURE: 142 MMHG | WEIGHT: 293 LBS | BODY MASS INDEX: 47.09 KG/M2 | HEART RATE: 112 BPM

## 2022-09-09 DIAGNOSIS — S39.012A STRAIN OF LUMBAR REGION, INITIAL ENCOUNTER: Primary | ICD-10-CM

## 2022-09-09 LAB
A/G RATIO: 1.3 (ref 1.1–2.2)
ALBUMIN SERPL-MCNC: 4 G/DL (ref 3.4–5)
ALP BLD-CCNC: 42 U/L (ref 40–129)
ALT SERPL-CCNC: 20 U/L (ref 10–40)
AMORPHOUS: ABNORMAL /HPF
ANION GAP SERPL CALCULATED.3IONS-SCNC: 17 MMOL/L (ref 3–16)
AST SERPL-CCNC: 22 U/L (ref 15–37)
BACTERIA: ABNORMAL /HPF
BASOPHILS ABSOLUTE: 0 K/UL (ref 0–0.2)
BASOPHILS RELATIVE PERCENT: 0.1 %
BILIRUB SERPL-MCNC: <0.2 MG/DL (ref 0–1)
BILIRUBIN URINE: ABNORMAL
BLOOD, URINE: NEGATIVE
BUN BLDV-MCNC: 8 MG/DL (ref 7–20)
CALCIUM SERPL-MCNC: 10 MG/DL (ref 8.3–10.6)
CHLORIDE BLD-SCNC: 100 MMOL/L (ref 99–110)
CLARITY: CLEAR
CO2: 18 MMOL/L (ref 21–32)
COLOR: YELLOW
CREAT SERPL-MCNC: <0.5 MG/DL (ref 0.6–1.1)
EOSINOPHILS ABSOLUTE: 0 K/UL (ref 0–0.6)
EOSINOPHILS RELATIVE PERCENT: 0.1 %
EPITHELIAL CELLS, UA: ABNORMAL /HPF (ref 0–5)
GFR AFRICAN AMERICAN: >60
GFR NON-AFRICAN AMERICAN: >60
GLUCOSE BLD-MCNC: 111 MG/DL (ref 70–99)
GLUCOSE URINE: NEGATIVE MG/DL
GONADOTROPIN, CHORIONIC (HCG) QUANT: NORMAL MIU/ML
HCT VFR BLD CALC: 35.6 % (ref 36–48)
HEMOGLOBIN: 12.2 G/DL (ref 12–16)
KETONES, URINE: >=80 MG/DL
LEUKOCYTE ESTERASE, URINE: NEGATIVE
LYMPHOCYTES ABSOLUTE: 0.3 K/UL (ref 1–5.1)
LYMPHOCYTES RELATIVE PERCENT: 2.9 %
MCH RBC QN AUTO: 33 PG (ref 26–34)
MCHC RBC AUTO-ENTMCNC: 34.2 G/DL (ref 31–36)
MCV RBC AUTO: 96.6 FL (ref 80–100)
MICROSCOPIC EXAMINATION: YES
MONOCYTES ABSOLUTE: 0.6 K/UL (ref 0–1.3)
MONOCYTES RELATIVE PERCENT: 6.6 %
MUCUS: ABNORMAL /LPF
NEUTROPHILS ABSOLUTE: 7.8 K/UL (ref 1.7–7.7)
NEUTROPHILS RELATIVE PERCENT: 90.3 %
NITRITE, URINE: NEGATIVE
PDW BLD-RTO: 13.7 % (ref 12.4–15.4)
PH UA: 6 (ref 5–8)
PLATELET # BLD: 214 K/UL (ref 135–450)
PMV BLD AUTO: 8.6 FL (ref 5–10.5)
POTASSIUM SERPL-SCNC: 3.7 MMOL/L (ref 3.5–5.1)
PROTEIN UA: 30 MG/DL
RBC # BLD: 3.68 M/UL (ref 4–5.2)
RBC UA: ABNORMAL /HPF (ref 0–4)
SODIUM BLD-SCNC: 135 MMOL/L (ref 136–145)
SPECIFIC GRAVITY UA: >=1.03 (ref 1–1.03)
TOTAL PROTEIN: 7.1 G/DL (ref 6.4–8.2)
URINE REFLEX TO CULTURE: ABNORMAL
URINE TYPE: ABNORMAL
UROBILINOGEN, URINE: 0.2 E.U./DL
WBC # BLD: 8.6 K/UL (ref 4–11)
WBC UA: ABNORMAL /HPF (ref 0–5)

## 2022-09-09 PROCEDURE — 81001 URINALYSIS AUTO W/SCOPE: CPT

## 2022-09-09 PROCEDURE — 6370000000 HC RX 637 (ALT 250 FOR IP): Performed by: NURSE PRACTITIONER

## 2022-09-09 PROCEDURE — 76770 US EXAM ABDO BACK WALL COMP: CPT

## 2022-09-09 PROCEDURE — 99284 EMERGENCY DEPT VISIT MOD MDM: CPT

## 2022-09-09 PROCEDURE — 84702 CHORIONIC GONADOTROPIN TEST: CPT

## 2022-09-09 PROCEDURE — 80053 COMPREHEN METABOLIC PANEL: CPT

## 2022-09-09 PROCEDURE — 85025 COMPLETE CBC W/AUTO DIFF WBC: CPT

## 2022-09-09 RX ORDER — BUPROPION HYDROCHLORIDE 150 MG/1
150 TABLET ORAL EVERY MORNING
COMMUNITY

## 2022-09-09 RX ORDER — 0.9 % SODIUM CHLORIDE 0.9 %
1000 INTRAVENOUS SOLUTION INTRAVENOUS ONCE
Status: DISCONTINUED | OUTPATIENT
Start: 2022-09-09 | End: 2022-09-09 | Stop reason: HOSPADM

## 2022-09-09 RX ORDER — LIDOCAINE 4 G/G
2 PATCH TOPICAL ONCE
Status: DISCONTINUED | OUTPATIENT
Start: 2022-09-09 | End: 2022-09-09 | Stop reason: HOSPADM

## 2022-09-09 RX ORDER — ACETAMINOPHEN 500 MG
1000 TABLET ORAL ONCE
Status: COMPLETED | OUTPATIENT
Start: 2022-09-09 | End: 2022-09-09

## 2022-09-09 RX ORDER — LIDOCAINE 4 G/G
1 PATCH TOPICAL DAILY
Qty: 30 PATCH | Refills: 0 | Status: SHIPPED | OUTPATIENT
Start: 2022-09-09 | End: 2022-10-09

## 2022-09-09 RX ORDER — ONDANSETRON 4 MG/1
4 TABLET, ORALLY DISINTEGRATING ORAL EVERY 8 HOURS PRN
Qty: 20 TABLET | Refills: 0 | Status: SHIPPED | OUTPATIENT
Start: 2022-09-09

## 2022-09-09 RX ADMIN — ACETAMINOPHEN 1000 MG: 500 TABLET ORAL at 18:46

## 2022-09-09 ASSESSMENT — PAIN SCALES - GENERAL: PAINLEVEL_OUTOF10: 8

## 2022-09-09 NOTE — ED PROVIDER NOTES
[facility] Emergency Department    CHIEF COMPLAINT  Abdominal Pain (Pt is 11 weeks pregnant and came in with lower back pain and lower abd pain ) and Back Pain      HISTORY OF PRESENT ILLNESS  Ayden Foster is a 40 y.o. female who presents to the ED complaining of bilateral flank pain that radiates to bilateral lower abdomen/groin region. Patient reports pain started suddenly in the middle of the night last night states that it is a 10 out of 10 feels like kidney since that she had in the past.  Patient is approximate 11 weeks pregnant with twins. Patient is G5, P7 this is her second set of twins. Patient denies vaginal bleeding, leakage of fluids, vaginal discharge, dysuria, hematuria. Patient does report multiple episodes of nausea and vomiting she feels from the pain today. Patient denies fever, chills, body aches, diarrhea, constipation. No other complaints, modifying factors or associated symptoms. Nursing notes reviewed. Past Medical History:   Diagnosis Date    Fibroid uterus     8 cm, @ cervix    Skin sore     pt has raised \"boils\" on abdomen, states not MRSA, one is reddened, all others are scarred    Thyroid disease      History reviewed. No pertinent surgical history. History reviewed. No pertinent family history.   Social History     Socioeconomic History    Marital status:      Spouse name: Not on file    Number of children: Not on file    Years of education: Not on file    Highest education level: Not on file   Occupational History    Not on file   Tobacco Use    Smoking status: Former     Packs/day: 1.00     Years: 9.00     Pack years: 9.00     Types: Cigarettes    Smokeless tobacco: Never   Vaping Use    Vaping Use: Not on file   Substance and Sexual Activity    Alcohol use: No    Drug use: No    Sexual activity: Yes     Partners: Male   Other Topics Concern    Not on file   Social History Narrative    Not on file     Social Determinants of Health     Financial Resource Strain: Not on file   Food Insecurity: Not on file   Transportation Needs: Not on file   Physical Activity: Not on file   Stress: Not on file   Social Connections: Not on file   Intimate Partner Violence: Not on file   Housing Stability: Not on file     No current facility-administered medications for this encounter. Current Outpatient Medications   Medication Sig Dispense Refill    buPROPion (WELLBUTRIN XL) 150 MG extended release tablet Take 150 mg by mouth every morning      lidocaine 4 % external patch Place 1 patch onto the skin daily 30 patch 0    ondansetron (ZOFRAN ODT) 4 MG disintegrating tablet Take 1 tablet by mouth every 8 hours as needed for Nausea 20 tablet 0    Prenatal MV-Min-Fe Fum-FA-DHA (PRENATAL 1 PO) Take by mouth      levothyroxine (SYNTHROID) 88 MCG tablet Take 150 mcg by mouth Daily        Allergies   Allergen Reactions    Bactrim [Sulfamethoxazole-Trimethoprim] Rash    Flagyl [Metronidazole] Rash       REVIEW OF SYSTEMS  10 systems reviewed, pertinent positives per HPI otherwise noted to be negative    PHYSICAL EXAM  BP (!) 142/96   Pulse (!) 112   Temp 98.4 °F (36.9 °C)   Resp 17   Ht 5' 6\" (1.676 m)   Wt 295 lb (133.8 kg)   LMP 06/17/2022   SpO2 96%   BMI 47.61 kg/m²   GENERAL APPEARANCE: Awake and alert. Cooperative. No acute distress. Vital signs are stable. Well appearing and non toxic. HEAD: Normocephalic. Atraumatic. EYES: PERRL. EOM's grossly intact. ENT: Mucous membranes are moist.   NECK: Supple. Normal ROM. HEART: Tachycardic. Distal pulses are equal and intact. Cap refill less than 2 seconds. LUNGS: Respirations unlabored. CTAB. Good air exchange. Speaking comfortably in full sentences. No wheezing, rhonchi, rales, stridor. ABDOMEN: Soft. Non-distended. Non-tender. No guarding or rebound. No rigidity. Bowel sounds are present. Negative hopper's. Negative McBurney's point. Negative CVA tenderness.    EXTREMITIES: No peripheral edema. Moves all extremities equally. All extremities neurovascularly intact. SKIN: Warm and dry. No acute rashes. NEUROLOGICAL: Alert and oriented. No gross facial drooping. Strength 5/5, sensation intact. Speech is clear. PSYCHIATRIC: Normal mood and affect. SCREENINGS       RADIOLOGY  US RENAL COMPLETE    Result Date: 9/9/2022  EXAMINATION: RETROPERITONEAL ULTRASOUND OF THE KIDNEYS AND URINARY BLADDER 9/9/2022 COMPARISON: None HISTORY: ORDERING SYSTEM PROVIDED HISTORY: bilateral flank pain r/o stone/hyrdro/pyleo TECHNOLOGIST PROVIDED HISTORY: Reason for exam:->bilateral flank pain r/o stone/hyrdro/pyleo Reason for Exam: bilat flank pain r/o stones, hydro, and pyelonephritis FINDINGS: Kidneys: The right kidney measures 12.1 in length and the left kidney measures 12.9 in length. Kidneys demonstrate normal cortical echogenicity. No evidence of hydronephrosis or intrarenal stones. Bladder: The bladder is decompressed, limiting evaluation. Incidentally noted twin intrauterine pregnancy. Fetal heart rate a: 171 beats per minute. Fetal heart rate B: 171 beats per minute. 1.  No evidence of hydronephrosis. 2.  Twin live intrauterine gestation. CONSULTS  IP CONSULT TO OB GYN    ED COURSE/MDM  Patient seen and evaluated. Old records reviewed. Diagnostic testing reviewed and results discussed. I have independently evaluated this patient based upon my scope of practice. Supervising physician was in the department for consultation as needed. Vijaya Stevens presented to the ED today with above noted complaints. Upon arrival to emergency department vital signs are stable she is notably tachycardic but afebrile nontoxic appearance. Patient extremely anxious rocking back and forth tearful states pain is a 10 out of 10. Given that she is pregnant we are limited on medications that are safe in pregnancy we discussed narcotics and ultimately decided against them at this time.   Initial treatment was Tylenol, lidocaine patches and sodium chloride bolus. Upon reevaluation symptoms greatly improved. Patient much more comfortable. Work-up unremarkable. Normal-appearing kidneys no sign of hydronephrosis or kidney stone. Urinalysis negative for infection    Fetal heart tones in the 170s for both baby A and B. OB/GYN consulted I spoke with Dr. Jarret Gandhi who has no further recommendation feels comfortable with patient going home with lidocaine patches and Tylenol with follow-up in their office she has an upcoming appointment on September 20. Patient has been agreeable to this plan of care        While in ED patient received   Medications   acetaminophen (TYLENOL) tablet 1,000 mg (1,000 mg Oral Given 9/9/22 6926)             At this point I do not feel the patient requires further work up and it is reasonable to discharge the patient. A discussion was had with the patient and/or their surrogate regarding diagnosis, diagnostic testing results, treatment/ plan of care, and follow up. There was shared decision-making between myself as well as the patient and/or their surrogate and we are all in agreement with discharge home. There was an opportunity for questions and all questions were answered to the best of my ability and to the satisfaction of the patient and/or patient family. Patient will follow up with obgyn for further evaluation/treatment. The patient was given strict return precautions as we discussed symptoms that would necessitate return to the ED. Patient will return to ED for new/worsening symptoms. The patient verbalized their understanding and agreement with the above plan. Please refer to AVS for further details regarding discharge instructions.       Results for orders placed or performed during the hospital encounter of 09/09/22   Urinalysis with Reflex to Culture    Specimen: Urine   Result Value Ref Range    Color, UA Yellow Straw/Yellow    Clarity, UA Clear Clear Glucose, Ur Negative Negative mg/dL    Bilirubin Urine SMALL (A) Negative    Ketones, Urine >=80 (A) Negative mg/dL    Specific Gravity, UA >=1.030 1.005 - 1.030    Blood, Urine Negative Negative    pH, UA 6.0 5.0 - 8.0    Protein, UA 30 (A) Negative mg/dL    Urobilinogen, Urine 0.2 <2.0 E.U./dL    Nitrite, Urine Negative Negative    Leukocyte Esterase, Urine Negative Negative    Microscopic Examination YES     Urine Type NotGiven     Urine Reflex to Culture Not Indicated    CBC with Auto Differential   Result Value Ref Range    WBC 8.6 4.0 - 11.0 K/uL    RBC 3.68 (L) 4.00 - 5.20 M/uL    Hemoglobin 12.2 12.0 - 16.0 g/dL    Hematocrit 35.6 (L) 36.0 - 48.0 %    MCV 96.6 80.0 - 100.0 fL    MCH 33.0 26.0 - 34.0 pg    MCHC 34.2 31.0 - 36.0 g/dL    RDW 13.7 12.4 - 15.4 %    Platelets 964 213 - 526 K/uL    MPV 8.6 5.0 - 10.5 fL    Neutrophils % 90.3 %    Lymphocytes % 2.9 %    Monocytes % 6.6 %    Eosinophils % 0.1 %    Basophils % 0.1 %    Neutrophils Absolute 7.8 (H) 1.7 - 7.7 K/uL    Lymphocytes Absolute 0.3 (L) 1.0 - 5.1 K/uL    Monocytes Absolute 0.6 0.0 - 1.3 K/uL    Eosinophils Absolute 0.0 0.0 - 0.6 K/uL    Basophils Absolute 0.0 0.0 - 0.2 K/uL   Comprehensive Metabolic Panel   Result Value Ref Range    Sodium 135 (L) 136 - 145 mmol/L    Potassium 3.7 3.5 - 5.1 mmol/L    Chloride 100 99 - 110 mmol/L    CO2 18 (L) 21 - 32 mmol/L    Anion Gap 17 (H) 3 - 16    Glucose 111 (H) 70 - 99 mg/dL    BUN 8 7 - 20 mg/dL    Creatinine <0.5 (L) 0.6 - 1.1 mg/dL    GFR Non-African American >60 >60    GFR African American >60 >60    Calcium 10.0 8.3 - 10.6 mg/dL    Total Protein 7.1 6.4 - 8.2 g/dL    Albumin 4.0 3.4 - 5.0 g/dL    Albumin/Globulin Ratio 1.3 1.1 - 2.2    Total Bilirubin <0.2 0.0 - 1.0 mg/dL    Alkaline Phosphatase 42 40 - 129 U/L    ALT 20 10 - 40 U/L    AST 22 15 - 37 U/L   hCG, quantitative, pregnancy   Result Value Ref Range    hCG Quant 230433.6 <5.0 mIU/mL   Microscopic Urinalysis   Result Value Ref Range Mucus, UA 3+ (A) None Seen /LPF    WBC, UA 0-2 0 - 5 /HPF    RBC, UA 0-2 0 - 4 /HPF    Epithelial Cells, UA 2-5 0 - 5 /HPF    Bacteria, UA Rare (A) None Seen /HPF    Amorphous, UA Rare /HPF       I estimate there is LOW risk for ABDOMINAL AORTIC ANEURYSM, CAUDA EQUINA SYNDROME, EPIDURAL MASS LESION, SPINAL STENOSIS, OR HERNIATED DISK CAUSING SEVERE STENOSIS, thus I consider the discharge disposition reasonable. Aydee Brush and I have discussed the diagnosis and risks, and we agree with discharging home to follow-up with their primary doctor. We also discussed returning to the Emergency Department immediately if new or worsening symptoms occur. We have discussed the symptoms which are most concerning (e.g., saddle anesthesia, urinary or bowel incontinence or retention, changing or worsening pain) that necessitate immediate return. Final Impression    1. Strain of lumbar region, initial encounter        Blood pressure (!) 142/96, pulse (!) 112, temperature 98.4 °F (36.9 °C), resp. rate 17, height 5' 6\" (1.676 m), weight 295 lb (133.8 kg), last menstrual period 06/17/2022, SpO2 96 %, unknown if currently breastfeeding. mdm    Patient was sent home with a prescription for below medication/s. I did [place] patient on appropriate use of these medication.   Discharge Medication List as of 9/9/2022  9:17 PM        START taking these medications    Details   lidocaine 4 % external patch Place 1 patch onto the skin daily, TransDERmal, DAILY Starting Fri 9/9/2022, Until Sun 10/9/2022, For 30 days, Disp-30 patch, R-0, Normal      ondansetron (ZOFRAN ODT) 4 MG disintegrating tablet Take 1 tablet by mouth every 8 hours as needed for Nausea, Disp-20 tablet, R-0Normal                 FOLLOW UP  Stella Hazel MD  [address], [address]          [facility]  ED  [address]        DISPOSITION  Patient was discharged to home in good condition. Comment: Please note this report has been produced using speech recognition software and may contain errors related to that system including errors in grammar, punctuation, and spelling, as well as words and phrases that may be inappropriate. If there are any questions or concerns please feel free to contact the dictating provider for clarification.             THEREES Celaya - CNP  09/10/22 7171

## 2022-09-10 NOTE — ED NOTES
Discharge instructions explained by ED provider. Patient verbalized understanding and denies any other concerns or complaints at this time. Patient vital signs stable and no acute signs or symptoms of distress noted at discharge. Patient deemed clinically stable. Patient d/c home with spouse.      Maira Washington RN  09/09/22 4798

## 2022-09-10 NOTE — DISCHARGE INSTRUCTIONS
No sign of a kidney stone it appears that your pain is all musculoskeletal follow-up with OB/GYN as scheduled on the 20th return to the emergency department for new or worsening symptoms

## 2022-09-10 NOTE — ED NOTES
0177- Called OB/GYN  Per Vishnu Jacob APRN-CNP  RE low back pain in pregnancy  2103- Guerda Krishnan returned page       Arnulfo Mccallum  09/09/22 2104

## 2022-11-18 ENCOUNTER — HOSPITAL ENCOUNTER (OUTPATIENT)
Dept: NON INVASIVE DIAGNOSTICS | Age: 37
Discharge: HOME | End: 2022-11-18
Payer: MEDICAID

## 2022-11-18 DIAGNOSIS — R01.1 NEWLY RECOGNIZED MURMUR: ICD-10-CM

## 2022-11-18 LAB
LV EF: 60 %
LVEF MODALITY: NORMAL

## 2022-11-18 PROCEDURE — 93306 TTE W/DOPPLER COMPLETE: CPT

## 2023-01-14 ENCOUNTER — HOSPITAL ENCOUNTER (OUTPATIENT)
Age: 38
Discharge: HOME | End: 2023-01-14
Attending: OBSTETRICS & GYNECOLOGY | Admitting: OBSTETRICS & GYNECOLOGY
Payer: MEDICAID

## 2023-01-14 VITALS
DIASTOLIC BLOOD PRESSURE: 58 MMHG | RESPIRATION RATE: 18 BRPM | BODY MASS INDEX: 46.81 KG/M2 | HEART RATE: 87 BPM | SYSTOLIC BLOOD PRESSURE: 112 MMHG | TEMPERATURE: 97.9 F | WEIGHT: 290 LBS | OXYGEN SATURATION: 96 %

## 2023-01-14 PROCEDURE — 99211 OFF/OP EST MAY X REQ PHY/QHP: CPT

## 2023-01-14 RX ORDER — ASPIRIN 81 MG/1
81 TABLET ORAL DAILY
COMMUNITY

## 2023-01-15 NOTE — PROGRESS NOTES
A&O x4, v/s stable, urine tox +.
Call placed to Dr José Luis Hawthorne regarding pt status. She is informed of active and reactive FHR x2, pt with no pain, fall that occurred at 1515 today. Plan is to monitor until pt is 4 hrs post fall event and then pt to go home after seen by house dr. Jos Davis informed of same.
Called Dr. Tasia Charles MD to evaluate patient. Stated she will be to bedside shortly.
Pt presents to triage 4 after falling on left side at 1515 today while holding her [age] year old. Pt spoke earlier with Dr José Luis Hawthorne and was advised to come to hospital for monitoring. EFM applied at this time. Pt has no apparent abrasions on side. Denies discomfort at this time.
Pt verbalized understanding of verbal and written discharge instructions and denies having questions at this time. Pt left OB unit at 1941 ambulatory, undelivered, and in stable condition, accompanied by FOB. Patient is not in active labor.
Vitals were obtained. Please see flowsheet. On tele SR 70s

## 2023-01-15 NOTE — H&P
Department of Obstetrics and Gynecology  Labor and Delivery  Attending Triage Note    Pt seen for Dr. Hannah Solorzano, ([facility]) Julian Mckeon. SUBJECTIVE:  Pt. Presents to triage c/o falling on left side 15:15 today. Pt missed a step, while carrying her 3 y/o, and fell on left side. Reports hit left lower abdomen and right knee. Pt was able to get up and walk. Denies pain. Denies VB, LOF, or contractions. +FM x 2. Preg c/b 1)twin gestation 2)AMA 3)Extreme obesity 4)prior h/o twin gestation 5) hypothyroidism complic preg 6)Anxiety in preg    OBJECTIVE    Vitals:    Vitals:    23 1827   BP: (!) 112/58   Pulse: 87   Resp: 18   SpO2: 96%      CONSTITUTIONAL:  awake, alert, cooperative, no apparent distress, and appears stated age  LUNGS:  No increased work of breathing, good air exchange, clear to auscultation bilaterally, no crackles or wheezing  CARDIOVASCULAR:  Normal apical impulse, regular rate and rhythm,  ABDOMEN: Gravid, no scars, no discolorations c/w bruising, normal bowel sounds, soft, non-distended, non-tender, no masses palpated  EXT: No C/C/E, FROM b/l lower extremeties, no bruising     Cervix:    Not examined    Fetal Position:  unsure    Membranes:  Intact    Fetal heart rate:         Baseline Heart Rate:  145-150's (Twin A), Cat I         Baseline Heart Rate: 135-145, (twin B), Cat. I    Contraction frequency: 0 minutes      T&S - A positive    ASSESSMENT & PLAN:    39 y/o  @ 30.1 wks. , [facility] 3/24/23  -s/p trauma in third trim - stable; no s/sx's of labor;tylenol prn for pain d/w pt. Plan f/u at sched appt or sooner prn  - Twins - tracing reassuring x 2        Attending provider Dr. Hannah Solorzano updated regarding patient status.

## 2023-02-21 LAB — GBS, EXTERNAL RESULT: NEGATIVE

## 2023-03-08 ENCOUNTER — HOSPITAL ENCOUNTER (INPATIENT)
Age: 38
LOS: 1 days | Discharge: HOME | End: 2023-03-09
Attending: OBSTETRICS & GYNECOLOGY | Admitting: OBSTETRICS & GYNECOLOGY
Payer: MEDICAID

## 2023-03-08 PROBLEM — Z37.9 NORMAL LABOR: Status: ACTIVE | Noted: 2023-03-08

## 2023-03-08 PROCEDURE — 1220000000 HC SEMI PRIVATE OB R&B

## 2023-03-08 RX ORDER — SODIUM CHLORIDE, SODIUM LACTATE, POTASSIUM CHLORIDE, CALCIUM CHLORIDE 600; 310; 30; 20 MG/100ML; MG/100ML; MG/100ML; MG/100ML
INJECTION, SOLUTION INTRAVENOUS CONTINUOUS
Status: DISCONTINUED | OUTPATIENT
Start: 2023-03-09 | End: 2023-03-09 | Stop reason: HOSPADM

## 2023-03-08 RX ORDER — SODIUM CHLORIDE 9 MG/ML
25 INJECTION, SOLUTION INTRAVENOUS PRN
Status: DISCONTINUED | OUTPATIENT
Start: 2023-03-08 | End: 2023-03-09 | Stop reason: HOSPADM

## 2023-03-08 RX ORDER — DIPHENHYDRAMINE HCL 25 MG
25 TABLET ORAL EVERY 4 HOURS PRN
Status: DISCONTINUED | OUTPATIENT
Start: 2023-03-08 | End: 2023-03-09 | Stop reason: HOSPADM

## 2023-03-08 RX ORDER — METHYLERGONOVINE MALEATE 0.2 MG/ML
200 INJECTION INTRAVENOUS PRN
Status: DISCONTINUED | OUTPATIENT
Start: 2023-03-08 | End: 2023-03-09 | Stop reason: HOSPADM

## 2023-03-08 RX ORDER — SODIUM CHLORIDE 0.9 % (FLUSH) 0.9 %
5-40 SYRINGE (ML) INJECTION EVERY 12 HOURS SCHEDULED
Status: DISCONTINUED | OUTPATIENT
Start: 2023-03-09 | End: 2023-03-09 | Stop reason: HOSPADM

## 2023-03-08 RX ORDER — MISOPROSTOL 100 UG/1
800 TABLET ORAL PRN
Status: DISCONTINUED | OUTPATIENT
Start: 2023-03-08 | End: 2023-03-09 | Stop reason: HOSPADM

## 2023-03-08 RX ORDER — ACETAMINOPHEN 325 MG/1
650 TABLET ORAL EVERY 4 HOURS PRN
Status: DISCONTINUED | OUTPATIENT
Start: 2023-03-08 | End: 2023-03-09 | Stop reason: HOSPADM

## 2023-03-08 RX ORDER — SODIUM CHLORIDE 0.9 % (FLUSH) 0.9 %
5-40 SYRINGE (ML) INJECTION PRN
Status: DISCONTINUED | OUTPATIENT
Start: 2023-03-08 | End: 2023-03-09 | Stop reason: HOSPADM

## 2023-03-08 RX ORDER — SODIUM CHLORIDE, SODIUM LACTATE, POTASSIUM CHLORIDE, AND CALCIUM CHLORIDE .6; .31; .03; .02 G/100ML; G/100ML; G/100ML; G/100ML
1000 INJECTION, SOLUTION INTRAVENOUS PRN
Status: DISCONTINUED | OUTPATIENT
Start: 2023-03-08 | End: 2023-03-09 | Stop reason: HOSPADM

## 2023-03-08 RX ORDER — ONDANSETRON 2 MG/ML
4 INJECTION INTRAMUSCULAR; INTRAVENOUS EVERY 6 HOURS PRN
Status: DISCONTINUED | OUTPATIENT
Start: 2023-03-08 | End: 2023-03-09 | Stop reason: HOSPADM

## 2023-03-08 RX ORDER — SODIUM CHLORIDE, SODIUM LACTATE, POTASSIUM CHLORIDE, AND CALCIUM CHLORIDE .6; .31; .03; .02 G/100ML; G/100ML; G/100ML; G/100ML
500 INJECTION, SOLUTION INTRAVENOUS PRN
Status: DISCONTINUED | OUTPATIENT
Start: 2023-03-08 | End: 2023-03-09 | Stop reason: HOSPADM

## 2023-03-08 RX ORDER — FOLIC ACID 1 MG/1
1 TABLET ORAL DAILY
COMMUNITY

## 2023-03-08 RX ORDER — CARBOPROST TROMETHAMINE 250 UG/ML
250 INJECTION, SOLUTION INTRAMUSCULAR PRN
Status: DISCONTINUED | OUTPATIENT
Start: 2023-03-08 | End: 2023-03-09 | Stop reason: HOSPADM

## 2023-03-09 VITALS
HEART RATE: 81 BPM | HEIGHT: 66 IN | TEMPERATURE: 97.5 F | WEIGHT: 293 LBS | BODY MASS INDEX: 47.09 KG/M2 | SYSTOLIC BLOOD PRESSURE: 123 MMHG | OXYGEN SATURATION: 100 % | RESPIRATION RATE: 16 BRPM | DIASTOLIC BLOOD PRESSURE: 74 MMHG

## 2023-03-09 LAB
ABO/RH: NORMAL
AMPHETAMINE SCREEN, URINE: NORMAL
ANTIBODY SCREEN: NORMAL
BARBITURATE SCREEN URINE: NORMAL
BASOPHILS ABSOLUTE: 0.1 K/UL (ref 0–0.2)
BASOPHILS RELATIVE PERCENT: 0.7 %
BENZODIAZEPINE SCREEN, URINE: NORMAL
BUPRENORPHINE URINE: NORMAL
CANNABINOID SCREEN URINE: NORMAL
COCAINE METABOLITE SCREEN URINE: NORMAL
EOSINOPHILS ABSOLUTE: 0.1 K/UL (ref 0–0.6)
EOSINOPHILS RELATIVE PERCENT: 0.9 %
FENTANYL SCREEN, URINE: NORMAL
HCT VFR BLD CALC: 39.7 % (ref 36–48)
HEMOGLOBIN: 13.4 G/DL (ref 12–16)
LYMPHOCYTES ABSOLUTE: 1.7 K/UL (ref 1–5.1)
LYMPHOCYTES RELATIVE PERCENT: 17.1 %
Lab: NORMAL
MCH RBC QN AUTO: 32.4 PG (ref 26–34)
MCHC RBC AUTO-ENTMCNC: 33.8 G/DL (ref 31–36)
MCV RBC AUTO: 95.7 FL (ref 80–100)
METHADONE SCREEN, URINE: NORMAL
MONOCYTES ABSOLUTE: 0.6 K/UL (ref 0–1.3)
MONOCYTES RELATIVE PERCENT: 5.6 %
NEUTROPHILS ABSOLUTE: 7.5 K/UL (ref 1.7–7.7)
NEUTROPHILS RELATIVE PERCENT: 75.7 %
OPIATE SCREEN URINE: NORMAL
OXYCODONE URINE: NORMAL
PDW BLD-RTO: 14.8 % (ref 12.4–15.4)
PH UA: 6
PHENCYCLIDINE SCREEN URINE: NORMAL
PLATELET # BLD: 168 K/UL (ref 135–450)
PMV BLD AUTO: 10.5 FL (ref 5–10.5)
RBC # BLD: 4.15 M/UL (ref 4–5.2)
TOTAL SYPHILLIS IGG/IGM: NORMAL
WBC # BLD: 9.9 K/UL (ref 4–11)

## 2023-03-09 PROCEDURE — 86901 BLOOD TYPING SEROLOGIC RH(D): CPT

## 2023-03-09 PROCEDURE — 86850 RBC ANTIBODY SCREEN: CPT

## 2023-03-09 PROCEDURE — 85025 COMPLETE CBC W/AUTO DIFF WBC: CPT

## 2023-03-09 PROCEDURE — 80307 DRUG TEST PRSMV CHEM ANLYZR: CPT

## 2023-03-09 PROCEDURE — 86900 BLOOD TYPING SEROLOGIC ABO: CPT

## 2023-03-09 PROCEDURE — 86780 TREPONEMA PALLIDUM: CPT

## 2023-03-09 NOTE — PROGRESS NOTES
Department of Obstetrics and Gynecology  Labor and Delivery  House officer Triage Note        SUBJECTIVE:    Canadce Garcia is a 40 y.o. [id number] at 37w5d here with Di-di twin gestation. Reports contractions started around 7:30 this evening and have persisted. States they are getting stronger, however they are not as intense as her last labor. +FM. Denies VB, LOF. Denies headache, vision change, RUQ pain, increased LE edema. Denies chest pain, shortness of breath, fever, chills, nausea, vomiting. Pregnancy has been complicated by short-interval pregnancy and maternal obesity. OBJECTIVE     Vitals:  Vitals:    03/08/23 2150   BP: 135/67   Pulse: 82   Resp:    Temp:    SpO2:          CONSTITUTIONAL:  awake, alert, cooperative, no apparent distress, and appears stated age  HEART: RR, skin well perfused  LUNGS: Respirations unlabored, no distress  ABDOMEN:  Soft non-tender, non-distended. No rebound or guarding. No uterine tenderness to palpation  MUSCULOSKELETAL:  No LE edema, TTP     Cervix:  4/60/ballotable with recheck 5/70/-2, vertex applied    Fetal Position:  vertex     Fetal heart rate:    Baby A:   Baseline: 135  Variability: moderate in degree  Accelerations: Present  Decelerations: Absent     Category 1    Reactive: Yes       Baby B:   Baseline: 135  Variability: moderate in degree  Accelerations: Present  Decelerations: Absent     Category 1    Reactive: Yes    Contractions: Present irregularly, every 3-5 minutes      ASSESSMENT:   Candace Garcia is a 40 y.o. [id number] at 37w5d     Di-di twin gestation    Rule out labor     - Cervix from 4-5 with well applied vertex on repeat exam      - Discussed findings with Dr. Juliette Shaikh with plan for overnight observation    4. Fetal wellbeing     - Reactive tracing x2    PLAN:    Dr Juliette Shaikh updated. Final plan and orders per Dr. Juliette Shaikh.       Romero Cruz DO

## 2023-03-09 NOTE — PLAN OF CARE
Problem: Pain  Goal: Verbalizes/displays adequate comfort level or baseline comfort level  Outcome: Adequate for Discharge

## 2023-03-09 NOTE — PROGRESS NOTES
Pt presents to triage with c/o ctx since 1900, have been becoming regular in timing and increasing in intensity, lost mucous plug earlier today - pt lives an hour away and is pregnant with twins, last baby 1 year ago. Placed on EFM at this time. Rating ctx 4/10 on pain scale.

## 2023-03-09 NOTE — PROGRESS NOTES
Updated Dr. Moreno Holding of pt's arrival, assessment, SVE unchanged from last office visit (4/60/ball) and intact. Initial BP was 141/82, reassessment 134/73 15 minutes later, denies any symptoms of HTN. Orders received to allow pt to ambulate once reactive NST on both fetus' and have HO recheck SVE in about 1 hour. If unchanged okay to d/c home as pt has scheduled induction for Friday.

## 2023-03-09 NOTE — PROGRESS NOTES
D/c instructions given. Pt verbalized understanding and denied questions. Pt left OB unit @ 0757 in stable condition, ambulatory and undelivered. Not in active labor. Will return for scheduled IOL 3/10/23 0800.

## 2023-03-09 NOTE — H&P
Department of Obstetrics and Gynecology   Obstetrics History and Physical        CHIEF COMPLAINT:  contractions    HISTORY OF PRESENT ILLNESS:      The patient is a 40 y.o. female at 41w10d. OB History          5    Para   4    Term   4       0    AB   0    Living   5         SAB   0    IAB   0    Ectopic   0    Molar   0    Multiple   1    Live Births   5            Patient presents with a chief complaint as above and is being admitted for observation    Estimated Due Date: Estimated Date of Delivery: 3/24/23    PRENATAL CARE:    Complicated by: multiple gestation    PAST OB HISTORY:  OB History          5    Para   4    Term   4       0    AB   0    Living   5         SAB   0    IAB   0    Ectopic   0    Molar   0    Multiple   1    Live Births   5                Past Medical History:        Diagnosis Date    Anxiety and depression     takes wellbutrin    Fibroid uterus     8 cm, @ cervix    Skin sore     pt has raised \"boils\" on abdomen, states not MRSA, one is reddened, all others are scarred    Thyroid disease      Past Surgical History:    History reviewed. No pertinent surgical history.   Allergies:  Bactrim [sulfamethoxazole-trimethoprim] and Flagyl [metronidazole]    Social History:    Social History     Socioeconomic History    Marital status:      Spouse name: Not on file    Number of children: Not on file    Years of education: Not on file    Highest education level: Not on file   Occupational History    Not on file   Tobacco Use    Smoking status: Some Days     Packs/day: 0.25     Years: 9.00     Pack years: 2.25     Types: Cigarettes    Smokeless tobacco: Never   Vaping Use    Vaping Use: Never used   Substance and Sexual Activity    Alcohol use: No    Drug use: No    Sexual activity: Yes     Partners: Male   Other Topics Concern    Not on file   Social History Narrative    Not on file     Social Determinants of Health     Financial Resource Strain: Not on file   Food Insecurity: Not on file   Transportation Needs: Not on file   Physical Activity: Not on file   Stress: Not on file   Social Connections: Not on file   Intimate Partner Violence: Not on file   Housing Stability: Not on file     Family History:   History reviewed. No pertinent family history. Medications Prior to Admission:  Medications Prior to Admission: folic acid (FOLVITE) 1 MG tablet, Take 1 mg by mouth daily  aspirin 81 MG EC tablet, Take 81 mg by mouth daily  buPROPion (WELLBUTRIN XL) 150 MG extended release tablet, Take 150 mg by mouth every morning  Prenatal MV-Min-Fe Fum-FA-DHA (PRENATAL 1 PO), Take by mouth  levothyroxine (SYNTHROID) 88 MCG tablet, Take 175 mcg by mouth Daily    REVIEW OF SYSTEMS:    wnl    PHYSICAL EXAM:  Vitals:    03/08/23 2150 03/09/23 0103 03/09/23 0455 03/09/23 0725   BP: 135/67 138/87 136/73 123/74   Pulse: 82 77 78 81   Resp:  18 18 16   Temp:  97.7 °F (36.5 °C) 97.6 °F (36.4 °C) 97.5 °F (36.4 °C)   TempSrc:  Oral Oral Oral   SpO2:    100%   Weight:       Height:         General appearance:  awake, alert, cooperative, no apparent distress, and appears stated age  Neurologic:  Awake, alert, oriented to name, place and time. Lungs:  No increased work of breathing, good air exchange  Abdomen:  Soft, non tender, gravid, consistent with her gestational age, EFW by Leopold's maneuver was 6# x 2   Fetal heart rate:  Reassuring. Pelvis:  Adequate pelvis  Cervix: 4cm no change  Contraction frequency:  q3-5 min-now infreq-no cervical change  Membranes:  Intact    wnl    ASSESSMENT AND PLAN:    Twin gestation @ 37 6/7 wks without active labor. NST-R x 2  D/c home with labor reviewed, [facility] reviewed  IOL tomorrow as scheduled.     Celena Vasquez

## 2023-03-09 NOTE — PROGRESS NOTES
Dr. Miguel Julien updated on cervical exam per Dr. Nick Maciel, orders received to admit pt at this time to continue monitoring for labor.

## 2023-03-10 ENCOUNTER — ANESTHESIA (OUTPATIENT)
Dept: LABOR AND DELIVERY | Age: 38
End: 2023-03-10
Payer: MEDICAID

## 2023-03-10 ENCOUNTER — HOSPITAL ENCOUNTER (INPATIENT)
Age: 38
LOS: 2 days | Discharge: HOME | DRG: 560 | End: 2023-03-12
Attending: OBSTETRICS & GYNECOLOGY | Admitting: OBSTETRICS & GYNECOLOGY
Payer: MEDICAID

## 2023-03-10 ENCOUNTER — APPOINTMENT (OUTPATIENT)
Dept: LABOR AND DELIVERY | Age: 38
DRG: 560 | End: 2023-03-10
Payer: MEDICAID

## 2023-03-10 ENCOUNTER — ANESTHESIA EVENT (OUTPATIENT)
Dept: LABOR AND DELIVERY | Age: 38
End: 2023-03-10
Payer: MEDICAID

## 2023-03-10 PROBLEM — O30.043 DICHORIONIC DIAMNIOTIC TWIN PREGNANCY IN THIRD TRIMESTER: Status: ACTIVE | Noted: 2023-03-10

## 2023-03-10 LAB
ABO/RH: NORMAL
AMPHETAMINE SCREEN, URINE: NORMAL
ANTIBODY SCREEN: NORMAL
BARBITURATE SCREEN URINE: NORMAL
BASOPHILS ABSOLUTE: 0 K/UL (ref 0–0.2)
BASOPHILS RELATIVE PERCENT: 0.5 %
BENZODIAZEPINE SCREEN, URINE: NORMAL
BUPRENORPHINE URINE: NORMAL
CANNABINOID SCREEN URINE: NORMAL
COCAINE METABOLITE SCREEN URINE: NORMAL
EOSINOPHILS ABSOLUTE: 0.1 K/UL (ref 0–0.6)
EOSINOPHILS RELATIVE PERCENT: 1 %
FENTANYL SCREEN, URINE: NORMAL
HCT VFR BLD CALC: 39 % (ref 36–48)
HEMOGLOBIN: 13.4 G/DL (ref 12–16)
LYMPHOCYTES ABSOLUTE: 1.3 K/UL (ref 1–5.1)
LYMPHOCYTES RELATIVE PERCENT: 14.6 %
Lab: NORMAL
MCH RBC QN AUTO: 32.8 PG (ref 26–34)
MCHC RBC AUTO-ENTMCNC: 34.3 G/DL (ref 31–36)
MCV RBC AUTO: 95.6 FL (ref 80–100)
METHADONE SCREEN, URINE: NORMAL
MONOCYTES ABSOLUTE: 0.6 K/UL (ref 0–1.3)
MONOCYTES RELATIVE PERCENT: 6.8 %
NEUTROPHILS ABSOLUTE: 6.7 K/UL (ref 1.7–7.7)
NEUTROPHILS RELATIVE PERCENT: 77.1 %
OPIATE SCREEN URINE: NORMAL
OXYCODONE URINE: NORMAL
PDW BLD-RTO: 14.2 % (ref 12.4–15.4)
PH UA: 6
PHENCYCLIDINE SCREEN URINE: NORMAL
PLATELET # BLD: 154 K/UL (ref 135–450)
PMV BLD AUTO: 10 FL (ref 5–10.5)
RBC # BLD: 4.08 M/UL (ref 4–5.2)
WBC # BLD: 8.7 K/UL (ref 4–11)

## 2023-03-10 PROCEDURE — 1220000000 HC SEMI PRIVATE OB R&B

## 2023-03-10 PROCEDURE — 6370000000 HC RX 637 (ALT 250 FOR IP): Performed by: OBSTETRICS & GYNECOLOGY

## 2023-03-10 PROCEDURE — 6360000002 HC RX W HCPCS: Performed by: OBSTETRICS & GYNECOLOGY

## 2023-03-10 PROCEDURE — 80307 DRUG TEST PRSMV CHEM ANLYZR: CPT

## 2023-03-10 PROCEDURE — 3700000025 EPIDURAL BLOCK: Performed by: ANESTHESIOLOGY

## 2023-03-10 PROCEDURE — 3E033VJ INTRODUCTION OF OTHER HORMONE INTO PERIPHERAL VEIN, PERCUTANEOUS APPROACH: ICD-10-PCS | Performed by: OBSTETRICS & GYNECOLOGY

## 2023-03-10 PROCEDURE — 86900 BLOOD TYPING SEROLOGIC ABO: CPT

## 2023-03-10 PROCEDURE — 51701 INSERT BLADDER CATHETER: CPT

## 2023-03-10 PROCEDURE — 7200000001 HC VAGINAL DELIVERY

## 2023-03-10 PROCEDURE — 86780 TREPONEMA PALLIDUM: CPT

## 2023-03-10 PROCEDURE — 2500000003 HC RX 250 WO HCPCS: Performed by: NURSE ANESTHETIST, CERTIFIED REGISTERED

## 2023-03-10 PROCEDURE — 86850 RBC ANTIBODY SCREEN: CPT

## 2023-03-10 PROCEDURE — 2580000003 HC RX 258: Performed by: OBSTETRICS & GYNECOLOGY

## 2023-03-10 PROCEDURE — 86901 BLOOD TYPING SEROLOGIC RH(D): CPT

## 2023-03-10 PROCEDURE — 85025 COMPLETE CBC W/AUTO DIFF WBC: CPT

## 2023-03-10 PROCEDURE — 88307 TISSUE EXAM BY PATHOLOGIST: CPT

## 2023-03-10 PROCEDURE — 10907ZC DRAINAGE OF AMNIOTIC FLUID, THERAPEUTIC FROM PRODUCTS OF CONCEPTION, VIA NATURAL OR ARTIFICIAL OPENING: ICD-10-PCS | Performed by: OBSTETRICS & GYNECOLOGY

## 2023-03-10 RX ORDER — MISOPROSTOL 100 UG/1
800 TABLET ORAL PRN
Status: DISCONTINUED | OUTPATIENT
Start: 2023-03-10 | End: 2023-03-10

## 2023-03-10 RX ORDER — SODIUM CHLORIDE, SODIUM LACTATE, POTASSIUM CHLORIDE, CALCIUM CHLORIDE 600; 310; 30; 20 MG/100ML; MG/100ML; MG/100ML; MG/100ML
INJECTION, SOLUTION INTRAVENOUS CONTINUOUS
Status: DISCONTINUED | OUTPATIENT
Start: 2023-03-10 | End: 2023-03-10

## 2023-03-10 RX ORDER — SODIUM CHLORIDE 9 MG/ML
25 INJECTION, SOLUTION INTRAVENOUS PRN
Status: DISCONTINUED | OUTPATIENT
Start: 2023-03-10 | End: 2023-03-10

## 2023-03-10 RX ORDER — SODIUM CHLORIDE, SODIUM LACTATE, POTASSIUM CHLORIDE, AND CALCIUM CHLORIDE .6; .31; .03; .02 G/100ML; G/100ML; G/100ML; G/100ML
500 INJECTION, SOLUTION INTRAVENOUS PRN
Status: DISCONTINUED | OUTPATIENT
Start: 2023-03-10 | End: 2023-03-10

## 2023-03-10 RX ORDER — SODIUM CHLORIDE, SODIUM LACTATE, POTASSIUM CHLORIDE, CALCIUM CHLORIDE 600; 310; 30; 20 MG/100ML; MG/100ML; MG/100ML; MG/100ML
INJECTION, SOLUTION INTRAVENOUS CONTINUOUS
Status: DISCONTINUED | OUTPATIENT
Start: 2023-03-10 | End: 2023-03-12 | Stop reason: HOSPADM

## 2023-03-10 RX ORDER — SODIUM CHLORIDE, SODIUM LACTATE, POTASSIUM CHLORIDE, AND CALCIUM CHLORIDE .6; .31; .03; .02 G/100ML; G/100ML; G/100ML; G/100ML
1000 INJECTION, SOLUTION INTRAVENOUS PRN
Status: DISCONTINUED | OUTPATIENT
Start: 2023-03-10 | End: 2023-03-10

## 2023-03-10 RX ORDER — ONDANSETRON 2 MG/ML
4 INJECTION INTRAMUSCULAR; INTRAVENOUS EVERY 6 HOURS PRN
Status: DISCONTINUED | OUTPATIENT
Start: 2023-03-10 | End: 2023-03-12 | Stop reason: HOSPADM

## 2023-03-10 RX ORDER — ONDANSETRON 2 MG/ML
4 INJECTION INTRAMUSCULAR; INTRAVENOUS EVERY 6 HOURS PRN
Status: DISCONTINUED | OUTPATIENT
Start: 2023-03-10 | End: 2023-03-10

## 2023-03-10 RX ORDER — METHYLERGONOVINE MALEATE 0.2 MG/ML
200 INJECTION INTRAVENOUS PRN
Status: DISCONTINUED | OUTPATIENT
Start: 2023-03-10 | End: 2023-03-10

## 2023-03-10 RX ORDER — SODIUM CHLORIDE 0.9 % (FLUSH) 0.9 %
5-40 SYRINGE (ML) INJECTION PRN
Status: DISCONTINUED | OUTPATIENT
Start: 2023-03-10 | End: 2023-03-12 | Stop reason: HOSPADM

## 2023-03-10 RX ORDER — FERROUS SULFATE 325(65) MG
325 TABLET ORAL 2 TIMES DAILY WITH MEALS
Status: DISCONTINUED | OUTPATIENT
Start: 2023-03-10 | End: 2023-03-12 | Stop reason: HOSPADM

## 2023-03-10 RX ORDER — SODIUM CHLORIDE 0.9 % (FLUSH) 0.9 %
5-40 SYRINGE (ML) INJECTION EVERY 12 HOURS SCHEDULED
Status: DISCONTINUED | OUTPATIENT
Start: 2023-03-10 | End: 2023-03-10

## 2023-03-10 RX ORDER — SODIUM CHLORIDE 0.9 % (FLUSH) 0.9 %
5-40 SYRINGE (ML) INJECTION PRN
Status: DISCONTINUED | OUTPATIENT
Start: 2023-03-10 | End: 2023-03-10

## 2023-03-10 RX ORDER — BUPIVACAINE HYDROCHLORIDE 2.5 MG/ML
INJECTION, SOLUTION EPIDURAL; INFILTRATION; INTRACAUDAL PRN
Status: DISCONTINUED | OUTPATIENT
Start: 2023-03-10 | End: 2023-03-10 | Stop reason: SDUPTHER

## 2023-03-10 RX ORDER — ACETAMINOPHEN 500 MG
1000 TABLET ORAL EVERY 8 HOURS PRN
Status: DISCONTINUED | OUTPATIENT
Start: 2023-03-10 | End: 2023-03-12 | Stop reason: HOSPADM

## 2023-03-10 RX ORDER — IBUPROFEN 600 MG/1
600 TABLET ORAL EVERY 8 HOURS PRN
Status: DISCONTINUED | OUTPATIENT
Start: 2023-03-10 | End: 2023-03-11

## 2023-03-10 RX ORDER — LANOLIN 100 %
OINTMENT (GRAM) TOPICAL PRN
Status: DISCONTINUED | OUTPATIENT
Start: 2023-03-10 | End: 2023-03-12 | Stop reason: HOSPADM

## 2023-03-10 RX ORDER — DOCUSATE SODIUM 100 MG/1
100 CAPSULE, LIQUID FILLED ORAL 2 TIMES DAILY PRN
Status: DISCONTINUED | OUTPATIENT
Start: 2023-03-10 | End: 2023-03-12 | Stop reason: HOSPADM

## 2023-03-10 RX ORDER — SIMETHICONE 80 MG
80 TABLET,CHEWABLE ORAL EVERY 6 HOURS PRN
Status: DISCONTINUED | OUTPATIENT
Start: 2023-03-10 | End: 2023-03-12 | Stop reason: HOSPADM

## 2023-03-10 RX ORDER — SODIUM CHLORIDE 9 MG/ML
INJECTION, SOLUTION INTRAVENOUS PRN
Status: DISCONTINUED | OUTPATIENT
Start: 2023-03-10 | End: 2023-03-12 | Stop reason: HOSPADM

## 2023-03-10 RX ORDER — SODIUM CHLORIDE 0.9 % (FLUSH) 0.9 %
5-40 SYRINGE (ML) INJECTION EVERY 12 HOURS SCHEDULED
Status: DISCONTINUED | OUTPATIENT
Start: 2023-03-10 | End: 2023-03-12 | Stop reason: HOSPADM

## 2023-03-10 RX ORDER — DOCUSATE SODIUM 100 MG/1
100 CAPSULE, LIQUID FILLED ORAL 2 TIMES DAILY
Status: DISCONTINUED | OUTPATIENT
Start: 2023-03-10 | End: 2023-03-10

## 2023-03-10 RX ORDER — BUPIVACAINE HYDROCHLORIDE 2.5 MG/ML
INJECTION, SOLUTION EPIDURAL; INFILTRATION; INTRACAUDAL
Status: COMPLETED
Start: 2023-03-10 | End: 2023-03-10

## 2023-03-10 RX ORDER — CARBOPROST TROMETHAMINE 250 UG/ML
250 INJECTION, SOLUTION INTRAMUSCULAR PRN
Status: DISCONTINUED | OUTPATIENT
Start: 2023-03-10 | End: 2023-03-10

## 2023-03-10 RX ORDER — OXYCODONE HYDROCHLORIDE 5 MG/1
5 TABLET ORAL EVERY 4 HOURS PRN
Status: DISCONTINUED | OUTPATIENT
Start: 2023-03-10 | End: 2023-03-12 | Stop reason: HOSPADM

## 2023-03-10 RX ADMIN — SODIUM CHLORIDE, POTASSIUM CHLORIDE, SODIUM LACTATE AND CALCIUM CHLORIDE: 600; 310; 30; 20 INJECTION, SOLUTION INTRAVENOUS at 08:52

## 2023-03-10 RX ADMIN — Medication 1 MILLI-UNITS/MIN: at 10:30

## 2023-03-10 RX ADMIN — Medication 15 ML/HR: at 14:08

## 2023-03-10 RX ADMIN — SODIUM CHLORIDE, POTASSIUM CHLORIDE, SODIUM LACTATE AND CALCIUM CHLORIDE: 600; 310; 30; 20 INJECTION, SOLUTION INTRAVENOUS at 13:40

## 2023-03-10 RX ADMIN — Medication 10 ML: at 20:32

## 2023-03-10 RX ADMIN — SODIUM CHLORIDE, POTASSIUM CHLORIDE, SODIUM LACTATE AND CALCIUM CHLORIDE: 600; 310; 30; 20 INJECTION, SOLUTION INTRAVENOUS at 13:06

## 2023-03-10 RX ADMIN — IBUPROFEN 600 MG: 600 TABLET, FILM COATED ORAL at 20:32

## 2023-03-10 RX ADMIN — BUPIVACAINE HYDROCHLORIDE 1 ML: 2.5 INJECTION, SOLUTION EPIDURAL; INFILTRATION; INTRACAUDAL; PERINEURAL at 14:01

## 2023-03-10 RX ADMIN — DOCUSATE SODIUM 100 MG: 100 CAPSULE, LIQUID FILLED ORAL at 20:33

## 2023-03-10 ASSESSMENT — PAIN SCALES - GENERAL: PAINLEVEL_OUTOF10: 4

## 2023-03-10 NOTE — ANESTHESIA PRE PROCEDURE
Department of Anesthesiology  Preprocedure Note       Name:  Sienna Alves   Age:  40 y.o.  :  1985                                          MRN:  [id number]         Date:  3/10/2023      Surgeon: * No surgeons listed *    Procedure: * No procedures listed *    Medications prior to admission:   Prior to Admission medications    Medication Sig Start Date End Date Taking?  Authorizing Provider   folic acid (FOLVITE) 1 MG tablet Take 1 mg by mouth daily    Historical Provider, MD   aspirin 81 MG EC tablet Take 81 mg by mouth daily  Patient not taking: Reported on 3/10/2023    Historical Provider, MD   buPROPion (WELLBUTRIN XL) 150 MG extended release tablet Take 150 mg by mouth every morning    Historical Provider, MD   Prenatal MV-Min-Fe Fum-FA-DHA (PRENATAL 1 PO) Take by mouth    Historical Provider, MD   levothyroxine (SYNTHROID) 88 MCG tablet Take 175 mcg by mouth Daily    Historical Provider, MD       Current medications:    Current Facility-Administered Medications   Medication Dose Route Frequency Provider Last Rate Last Admin    lactated ringers IV soln infusion   IntraVENous Continuous Rudell Cogan,  mL/hr at 03/10/23 1306 New Bag at 03/10/23 1306    lactated ringers bolus  500 mL IntraVENous PRN Rudell Cogan, MD        Or    lactated ringers bolus  1,000 mL IntraVENous PRN Rudell Cogan, MD        sodium chloride flush 0.9 % injection 5-40 mL  5-40 mL IntraVENous 2 times per day Rudell Cogan, MD        sodium chloride flush 0.9 % injection 5-40 mL  5-40 mL IntraVENous PRN Rudell Cogan, MD        0.9 % sodium chloride infusion  25 mL IntraVENous PRN Rudell Cogan, MD        methylergonovine (METHERGINE) injection 200 mcg  200 mcg IntraMUSCular PRN Rudell Cogan, MD        carboprost (HEMABATE) injection 250 mcg  250 mcg IntraMUSCular PRN Rudell Cogan, MD        miSOPROStol (CYTOTEC) tablet 800 mcg  800 mcg Rectal PRN Rudell Cogan, MD        tranexamic acid (CYKLOKAPRON) 1,000 mg in sodium chloride 0.9 % 100 mL IVPB  1,000 mg IntraVENous Once PRN Rudell Cogan, MD        oxytocin (PITOCIN) 30 units in 500 mL infusion  87.3 lamine-units/min IntraVENous Continuous PRN Rudell Cogan, MD        And    oxytocin (PITOCIN) 10 unit bolus from the bag  10 Units IntraVENous PRN Rudell Cogan, MD        ondansetron [facility]) injection 4 mg  4 mg IntraVENous Q6H PRN Rudell Cogan, MD        docusate sodium (COLACE) capsule 100 mg  100 mg Oral BID Rudell Cogan, MD        oxytocin (PITOCIN) 30 units in 500 mL infusion  1-20 lamine-units/min IntraVENous Continuous Rudell Cogan, MD 4 mL/hr at 03/10/23 1300 4 lamine-units/min at 03/10/23 1300     Facility-Administered Medications Ordered in Other Encounters   Medication Dose Route Frequency Provider Last Rate Last Admin    bupivacaine (PF) (MARCAINE) 0.25 % injection   IntraSPINal PRN Lucrecia Ahr, APRN - CRNA   1 mL at 03/10/23 1401    sodium chloride 0.9 % 200 mL with fentaNYL 500 mcg, bupivacaine 0.5% 50 mL (OB) epidural   Epidural Continuous PRN Lucrecia Ahr, APRN - CRNA 15 mL/hr at 03/10/23 1408 15 mL/hr at 03/10/23 1408       Allergies: Allergies   Allergen Reactions    Bactrim [Sulfamethoxazole-Trimethoprim] Rash    Flagyl [Metronidazole] Rash       Problem List:    Patient Active Problem List   Diagnosis Code    Right knee sprain S83. 91XA    Patellar subluxation S83.003A    Post-term pregnancy, 40-42 weeks of gestation O46.0    Post-dates pregnancy O48.0    Vaginal delivery O80    Normal labor [de-identified], Z39.9    Dichorionic diamniotic twin pregnancy in third trimester O30.043       Past Medical History:        Diagnosis Date    Anxiety and depression     takes wellbutrin    Fibroid uterus     8 cm, @ cervix    Skin sore     pt has raised \"boils\" on abdomen, states not MRSA, one is reddened, all others are scarred    Thyroid disease        Past Surgical History:  History reviewed.  No pertinent surgical history. Social History:    Social History     Tobacco Use    Smoking status: Some Days     Packs/day: 0.25     Years: 9.00     Pack years: 2.25     Types: Cigarettes    Smokeless tobacco: Never   Substance Use Topics    Alcohol use:  No                                Ready to quit: Not Answered  Counseling given: Not Answered      Vital Signs (Current):   Vitals:    03/10/23 1036 03/10/23 1130 03/10/23 1230 03/10/23 1330   BP: (!) 114/56 135/72 128/81 125/63   Pulse: 75 81 82 (P) 74   Resp:   16    Temp:   36.9 °C (98.4 °F)    TempSrc:   Oral    SpO2:                                                  BP Readings from Last 3 Encounters:   03/10/23 125/63   03/09/23 123/74   01/14/23 (!) 112/58       NPO Status:                                                                                 BMI:   Wt Readings from Last 3 Encounters:   03/08/23 294 lb (133.4 kg)   01/14/23 290 lb (131.5 kg)   09/09/22 295 lb (133.8 kg)     There is no height or weight on file to calculate BMI.    CBC:   Lab Results   Component Value Date/Time    WBC 8.7 03/10/2023 08:52 AM    RBC 4.08 03/10/2023 08:52 AM    HGB 13.4 03/10/2023 08:52 AM    HCT 39.0 03/10/2023 08:52 AM    MCV 95.6 03/10/2023 08:52 AM    RDW 14.2 03/10/2023 08:52 AM     03/10/2023 08:52 AM       CMP:   Lab Results   Component Value Date/Time     09/09/2022 06:53 PM    K 3.7 09/09/2022 06:53 PM     09/09/2022 06:53 PM    CO2 18 09/09/2022 06:53 PM    BUN 8 09/09/2022 06:53 PM    CREATININE <0.5 09/09/2022 06:53 PM    GFRAA >60 09/09/2022 06:53 PM    AGRATIO 1.3 09/09/2022 06:53 PM    LABGLOM >60 09/09/2022 06:53 PM    GLUCOSE 111 09/09/2022 06:53 PM    PROT 7.1 09/09/2022 06:53 PM    CALCIUM 10.0 09/09/2022 06:53 PM    BILITOT <0.2 09/09/2022 06:53 PM    ALKPHOS 42 09/09/2022 06:53 PM    AST 22 09/09/2022 06:53 PM    ALT 20 09/09/2022 06:53 PM       POC Tests: No results for input(s): POCGLU, POCNA, POCK, POCCL, POCBUN, POCHEMO, POCHCT in the last 72 hours.    Coags: No results found for: PROTIME, INR, APTT    HCG (If Applicable): No results found for: PREGTESTUR, PREGSERUM, HCG, HCGQUANT     ABGs: No results found for: PHART, PO2ART, GKL0KRY, XIF3EPD, BEART, B1IJJLTU     Type & Screen (If Applicable):  No results found for: LABABO, LABRH    Drug/Infectious Status (If Applicable):  No results found for: HIV, HEPCAB    COVID-19 Screening (If Applicable):   Lab Results   Component Value Date/Time    COVID19 Not Detected 12/29/2021 11:42 AM           Anesthesia Evaluation  Patient summary reviewed and Nursing notes reviewed no history of anesthetic complications:   Airway: Mallampati: II  TM distance: >3 FB   Neck ROM: full  Mouth opening: > = 3 FB   Dental:          Pulmonary:Negative Pulmonary ROS                              Cardiovascular:Negative CV ROS                      Neuro/Psych:   (+) psychiatric history:            GI/Hepatic/Renal:   (+) morbid obesity          Endo/Other:    (+) hyperthyroidism::., .                 Abdominal:   (+) obese,           Vascular: negative vascular ROS.         Other Findings:           Anesthesia Plan      epidural     ASA 3 - emergent             Anesthetic plan and risks discussed with patient.      Plan discussed with attending.            Alternatives to, benifits and risks of continuous lumbar epidural for labor (including, but not limited to, hypotension, spinal headache, inadequate sensory blockade) were discussed in detail with the patient.  All questions were answered to her satisfaction.  The patient desires and agrees to proceed with continuous epidural.        THERESE Piña - CRNA   3/10/2023

## 2023-03-10 NOTE — PROGRESS NOTES
Comfortable with epidural   Vitals:    03/10/23 1430   BP: 127/71   Pulse: 99   Resp:    Temp:    SpO2:      Cat 1 FHTS X 1   SVE 6/90/-2  FSE replaced     Pit at 145 Paige Belcher MD

## 2023-03-10 NOTE — PROGRESS NOTES
Comfortable, starting to feel contractions  Vitals:    03/10/23 1230   BP: 128/81   Pulse: 82   Resp: 16   Temp: (P) 98.4 °F (36.9 °C)   SpO2:      Cat 1 FHTs X2   SVE 5/70/-2  AROM clear fluid, FSE placed without difficulty     Pit at 4    Continue pitocin induction   Reviewed double set up in OR for delivery   Recommend epidural     Donte Quiroga MD

## 2023-03-10 NOTE — PROGRESS NOTES
[address] notified pt is requesting epidural  1343- Naivd in room  1359- epidural in  1401- test dose

## 2023-03-10 NOTE — H&P
Department of Obstetrics and Gynecology   Obstetrics History and Physical        CHIEF COMPLAINT:  IOL    HISTORY OF PRESENT ILLNESS:      The patient is a 40 y.o. female at 38w0d. OB History          5    Para   4    Term   4       0    AB   0    Living   5         SAB   0    IAB   0    Ectopic   0    Molar   0    Multiple   1    Live Births   5            Patient presents with a chief complaint as above and is being admitted for induction    Di/di twin gestation   BPPs qweek starting 34 weeks   Fibroid- lower uterine segment 5cm left lateral subserosal  Mood disorder -effexor   BMI 49   Hx GHTN - asa 81mg daily this pregnancy   GBS neg   Hypothyroid on meds  This week vertex/transverse on sono 3/7   Growth 23   A 2548g 31%tile   B 2510g 27%tile   Normal fluid     Estimated Due Date: Estimated Date of Delivery: 3/24/23        PAST OB HISTORY:  OB History          5    Para   4    Term   4       0    AB   0    Living   5         SAB   0    IAB   0    Ectopic   0    Molar   0    Multiple   1    Live Births   5                Past Medical History:        Diagnosis Date    Anxiety and depression     takes wellbutrin    Fibroid uterus     8 cm, @ cervix    Skin sore     pt has raised \"boils\" on abdomen, states not MRSA, one is reddened, all others are scarred    Thyroid disease      Past Surgical History:    History reviewed. No pertinent surgical history.   Allergies:  Bactrim [sulfamethoxazole-trimethoprim] and Flagyl [metronidazole]    Social History:    Social History     Socioeconomic History    Marital status:      Spouse name: Not on file    Number of children: Not on file    Years of education: Not on file    Highest education level: Not on file   Occupational History    Not on file   Tobacco Use    Smoking status: Some Days     Packs/day: 0.25     Years: 9.00     Pack years: 2.25     Types: Cigarettes    Smokeless tobacco: Never   Vaping Use    Vaping Use: Never used Substance and Sexual Activity    Alcohol use: No    Drug use: No    Sexual activity: Yes     Partners: Male   Other Topics Concern    Not on file   Social History Narrative    Not on file     Social Determinants of Health     Financial Resource Strain: Not on file   Food Insecurity: Not on file   Transportation Needs: Not on file   Physical Activity: Not on file   Stress: Not on file   Social Connections: Not on file   Intimate Partner Violence: Not on file   Housing Stability: Not on file     Family History:   History reviewed. No pertinent family history. Medications Prior to Admission:  Medications Prior to Admission: folic acid (FOLVITE) 1 MG tablet, Take 1 mg by mouth daily  aspirin 81 MG EC tablet, Take 81 mg by mouth daily (Patient not taking: Reported on 3/10/2023)  buPROPion (WELLBUTRIN XL) 150 MG extended release tablet, Take 150 mg by mouth every morning  Prenatal MV-Min-Fe Fum-FA-DHA (PRENATAL 1 PO), Take by mouth  levothyroxine (SYNTHROID) 88 MCG tablet, Take 175 mcg by mouth Daily        PHYSICAL EXAM:  Vitals:    03/10/23 0826   BP: 134/77   Pulse: 88   Resp: 18   Temp: 97.9 °F (36.6 °C)   TempSrc: Oral   SpO2: 98%     General appearance:  awake, alert, cooperative, no apparent distress, and appears stated age  Neurologic:  Awake, alert, oriented to name, place and time. Lungs:  No increased work of breathing, good air exchange  Abdomen:  Soft, non tender, gravid, consistent with her gestational age  Fetal heart rate:  Reassuring.   Pelvis:  Adequate pelvis  Cervix: 4cm  Contraction frequency:  rare    Membranes:  Intact    Labs: CBC:   Lab Results   Component Value Date/Time    WBC 8.7 03/10/2023 08:52 AM    RBC 4.08 03/10/2023 08:52 AM    HGB 13.4 03/10/2023 08:52 AM    HCT 39.0 03/10/2023 08:52 AM    MCV 95.6 03/10/2023 08:52 AM    MCH 32.8 03/10/2023 08:52 AM    MCHC 34.3 03/10/2023 08:52 AM    RDW 14.2 03/10/2023 08:52 AM     03/10/2023 08:52 AM    MPV 10.0 03/10/2023 08:52 AM ASSESSMENT AND PLAN:    Labor: Admit, anticipate normal delivery, routine labor orders  Fetus: Reassuring  GBS: No  Other: IOL-reviewed double set up in OR for delivery, desires breech extraction of baby B if baby comes down breech.  Reviewed double set up in [facility] [address] for delivery , plans epidural     Gi Camacho MD

## 2023-03-10 NOTE — ANESTHESIA PROCEDURE NOTES
Epidural Block    Patient location during procedure: OB  Start time: 3/10/2023 1:54 PM  End time: 3/10/2023 2:04 PM  Reason for block: labor epidural  Staffing  Performed: resident/CRNA   Anesthesiologist: Alexandr Fleming MD  Resident/CRNA: THERESE Stallings - CRNA  Epidural  Patient position: sitting  Prep: Betadine  Patient monitoring: cardiac monitor, continuous pulse ox and frequent blood pressure checks  Approach: midline  Location: L2-3  Injection technique: TESS saline  Provider prep: mask and sterile gloves  Needle  Needle type: Tuohy   Needle gauge: 17 G  Needle length: 3.5 in  Catheter type: multi-orifice  Catheter size: 19 G (20 G)  Test dose: negative (3 cc of 1.5 % xylocaine with epi)Catheter Secured: tegaderm  Assessment  Sensory level: T8  Events: paresthesia and Left transient  Hemodynamics: stable  Attempts: 1  Outcomes: uncomplicated and patient tolerated procedure well  Additional Notes  Pt. prepped and draped in sterile fashion. Skin wheal with 1% lidocaine. 17ga touhy needle to TESS. 25 ga. Spinal needle per touhy. CSF visualized in hub and 1 cc of 0.25% bupivacaine injected. Needle withdrawn and catheter threaded. Negative test dose. Catheter secured with sterile dressing.    Preanesthetic Checklist  Completed: patient identified, IV checked, site marked, risks and benefits discussed, surgical/procedural consents, equipment checked, pre-op evaluation, timeout performed, anesthesia consent given, oxygen available and monitors applied/VS acknowledged

## 2023-03-10 NOTE — PROGRESS NOTES
Spoke with Dr. Gela Bolton via telephone to make her aware Baby B unable to trace. This RN at bedside for 30+ minutes attempting to trace FHR B. Will be to bedside shortly.

## 2023-03-10 NOTE — L&D DELIVERY NOTE
Department of Obstetrics and Gynecology  Spontaneous Vaginal Delivery Note    Labor & Delivery Summary  Dilation Complete Date: 03/10/23  Dilation Complete Time: 1540    Pre-operative Diagnosis:   at 38weeks di/di twins IOL     Post-operative Diagnosis:  Female/female    Procedure:  Spontaneous vaginal delivery X2 double set up in OR     Surgeon:        Information for the patient's :  Adrienne Williamson [[id number]]   APGAR One: N/A   Information for the patient's :  Lethea Reynolds Girl Murphy Jeans [[id number]]   APGAR One: 8    Information for the patient's :  Adrienne Williamson [[id number]]   APGAR Five: N/A   Information for the patient's :  Lethea Reynolds Girl Murphy Jeans [[id number]]   APGAR Five: 9     Information for the patient's :  Adrienne Williamson [[id number]]   Birth Weight: 6 lb 0.1 oz (2.725 kg)   Information for the patient's :  Lethea Reynolds Girl Murphy Jeans [[id number]]   Birth Weight: 5 lb 2.7 oz (2.345 kg)     Anesthesia:  epidural anesthesia    Estimated blood loss:  200ml    Specimen:  Placenta sent to pathology     Cord blood sent Yes    Complications:  none    Condition:  infant stable to general nursery and mother stable    See dictation#:[id number]    Jagdish Rivas MD

## 2023-03-11 LAB
HCT VFR BLD CALC: 31.2 % (ref 36–48)
HEMOGLOBIN: 10.8 G/DL (ref 12–16)
MCH RBC QN AUTO: 33 PG (ref 26–34)
MCHC RBC AUTO-ENTMCNC: 34.6 G/DL (ref 31–36)
MCV RBC AUTO: 95.2 FL (ref 80–100)
PDW BLD-RTO: 14.4 % (ref 12.4–15.4)
PLATELET # BLD: 134 K/UL (ref 135–450)
PMV BLD AUTO: 10.2 FL (ref 5–10.5)
RBC # BLD: 3.28 M/UL (ref 4–5.2)
TOTAL SYPHILLIS IGG/IGM: NORMAL
WBC # BLD: 7.3 K/UL (ref 4–11)

## 2023-03-11 PROCEDURE — 36415 COLL VENOUS BLD VENIPUNCTURE: CPT

## 2023-03-11 PROCEDURE — 85027 COMPLETE CBC AUTOMATED: CPT

## 2023-03-11 PROCEDURE — 6370000000 HC RX 637 (ALT 250 FOR IP): Performed by: OBSTETRICS & GYNECOLOGY

## 2023-03-11 PROCEDURE — 1220000000 HC SEMI PRIVATE OB R&B

## 2023-03-11 RX ORDER — IBUPROFEN 800 MG/1
800 TABLET ORAL EVERY 8 HOURS PRN
Status: DISCONTINUED | OUTPATIENT
Start: 2023-03-11 | End: 2023-03-12 | Stop reason: HOSPADM

## 2023-03-11 RX ORDER — BUPROPION HYDROCHLORIDE 150 MG/1
150 TABLET, EXTENDED RELEASE ORAL DAILY
Status: DISCONTINUED | OUTPATIENT
Start: 2023-03-11 | End: 2023-03-12 | Stop reason: HOSPADM

## 2023-03-11 RX ADMIN — IBUPROFEN 800 MG: 800 TABLET, FILM COATED ORAL at 11:52

## 2023-03-11 RX ADMIN — DOCUSATE SODIUM 100 MG: 100 CAPSULE, LIQUID FILLED ORAL at 09:18

## 2023-03-11 RX ADMIN — IBUPROFEN 800 MG: 800 TABLET, FILM COATED ORAL at 21:04

## 2023-03-11 RX ADMIN — ACETAMINOPHEN 1000 MG: 500 TABLET ORAL at 01:10

## 2023-03-11 RX ADMIN — IBUPROFEN 600 MG: 600 TABLET, FILM COATED ORAL at 04:12

## 2023-03-11 RX ADMIN — ACETAMINOPHEN 1000 MG: 500 TABLET ORAL at 17:27

## 2023-03-11 RX ADMIN — ACETAMINOPHEN 1000 MG: 500 TABLET ORAL at 09:18

## 2023-03-11 ASSESSMENT — PAIN DESCRIPTION - LOCATION
LOCATION: ABDOMEN
LOCATION: ABDOMEN;BACK;PERINEUM
LOCATION: ABDOMEN

## 2023-03-11 ASSESSMENT — PAIN SCALES - GENERAL
PAINLEVEL_OUTOF10: 3
PAINLEVEL_OUTOF10: 3
PAINLEVEL_OUTOF10: 4
PAINLEVEL_OUTOF10: 6
PAINLEVEL_OUTOF10: 3
PAINLEVEL_OUTOF10: 3

## 2023-03-11 ASSESSMENT — PAIN - FUNCTIONAL ASSESSMENT
PAIN_FUNCTIONAL_ASSESSMENT: ACTIVITIES ARE NOT PREVENTED

## 2023-03-11 ASSESSMENT — PAIN DESCRIPTION - DESCRIPTORS
DESCRIPTORS: CRAMPING

## 2023-03-11 ASSESSMENT — PAIN DESCRIPTION - ORIENTATION
ORIENTATION: LOWER
ORIENTATION: LOWER

## 2023-03-11 NOTE — PROGRESS NOTES
Department of Obstetrics and Gynecology  Labor and Delivery  Attending Post Partum Progress Note      SUBJECTIVE:  No probs. Cramping that is alleviated with ibuprofen. Lochia normal. Infants are well. OBJECTIVE:      Vitals:  /86   Pulse 78   Temp 97.6 °F (36.4 °C) (Oral)   Resp 16   LMP 2022   SpO2 99%   Breastfeeding Unknown     ABDOMEN:  FFNT  GENITAL/URINARY:  deferred    DATA:    CBC:    Lab Results   Component Value Date/Time    WBC 7.3 2023 06:07 AM    RBC 3.28 2023 06:07 AM    HGB 10.8 2023 06:07 AM    HCT 31.2 2023 06:07 AM    MCV 95.2 2023 06:07 AM    RDW 14.4 2023 06:07 AM     2023 06:07 AM       ASSESSMENT & PLAN:      IMP:  PPD#1 S/P  of twins, doing well. PLAN:  Continue PP care. Probable discharge 3/12/23.     Fatuma Overton MD

## 2023-03-11 NOTE — L&D DELIVERY SUMMARY NOTE
[address] [id number]                            LABOR AND DELIVERY NOTE    PATIENT NAME: Keeley Singh                  :        1985  MED REC NO:   [id number]                          ROOM:       4616  ACCOUNT NO:   [de-identified]                           ADMIT DATE: 03/10/2023  PROVIDER:     Franko Rodriguez MD    DATE OF PROCEDURE:  03/10/2023    DELIVERY SUMMARY    The patient is a 41-year-old G5, P4-0-0-5, who presented for induction  of labor. This was a di-di twin gestation. She has been getting BPPs  every week starting at 34 weeks along with growth ultrasound. Most  recent growth on 2023, showed baby A at the 31st percentile for  growth and B at the 27th percentile for growth. Of note, she does have  a fibroid in the lower uterine segment, 5 cm, left lateral subserosal.   BMI was 49. History of gestational hypertension. She has been on baby  aspirin daily this pregnancy. Noted to be GBS negative and she does  have a history of hypothyroidism, on medication. The most recent  ultrasound showed baby A vertex position, baby B in transverse position. On admission, the patient was noted to be 4 cm dilated. Induction of  labor was reviewed. Double setup in the OR planned and she does desire  breech extraction, the baby B comes down breech with planned epidural.   She was started on Pitocin and was artificially ruptured for clear  fluid. FSE was placed. The patient requested and received an epidural  and made change to complete and +1 station and at this time was taken to  the OR for double set up. Once was set up, she began pushing and pushed  two contractions and delivered baby A.  Baby A was vigorous upon  delivery and one minute delayed cord clamping was done and the cord was  labelled.   Next, attention was turned back to the vagina, where the bag  was palpated and baby B was palpated to be cephalic. The bag was  artificially ruptured for clear fluid and then the mom pushed with one  contraction. She delivered the second viable B female infant in  cephalic presentation. Baby was vigorous upon delivery. Delayed cord  clamping was observed. Cord was cut and clamped and labelled. The  placenta was delivered spontaneously with three-vessel cord intact. The  perineum was explored and noted to be intact. Both baby doing well at  the end of this delivery. Mom and baby both doing well.  mL.         Kalyani Ron MD    D: 03/10/2023 17:19:18       T: 03/10/2023 21:39:17     [email or web address]  Job#: [id number]     Doc#: [id number]    CC:

## 2023-03-11 NOTE — PROGRESS NOTES
First time get up done with second RN, Carol Barron. Epidural removed with tip intact, and excess medication wasted. Pt tolerated well and voided 300 ml out at this time. Comfort pad placed on bed and all linens changed. Writer assisted MOB with amador care.

## 2023-03-11 NOTE — PLAN OF CARE
Problem: Vaginal Birth or  Section  Goal: Fetal and maternal status remain reassuring during the birth process  Description:  Birth OB-Pregnancy care plan goal which identifies if the fetal and maternal status remain reassuring during the birth process  Outcome: Progressing     Problem: Postpartum  Goal: Experiences normal postpartum course  Description:  Postpartum OB-Pregnancy care plan goal which identifies if the mother is experiencing a normal postpartum course  Outcome: Progressing     Problem: Postpartum  Goal: Appropriate maternal -  bonding  Description:  Postpartum OB-Pregnancy care plan goal which identifies if the mother and  are bonding appropriately  Outcome: Progressing     Problem: Postpartum  Goal: Establishment of infant feeding pattern  Description:  Postpartum OB-Pregnancy care plan goal which identifies if the mother is establishing a feeding pattern with their   Outcome: Progressing     Problem: Postpartum  Goal: Incisions, wounds, or drain sites healing without S/S of infection  Outcome: Progressing

## 2023-03-11 NOTE — ANESTHESIA POSTPROCEDURE EVALUATION
Department of Anesthesiology  Postprocedure Note    Patient: Michael Mahan  MRN: [id number]  YOB: 1985  Date of evaluation: 3/11/2023      Procedure Summary     Date: 03/10/23 Room / Location:     Anesthesia Start: 8737 Anesthesia Stop: 6281    Procedure: Labor Analgesia Diagnosis:     Scheduled Providers:  Responsible Provider: Beth Matos MD    Anesthesia Type: epidural ASA Status: 3 - Emergent          Anesthesia Type: No value filed. Darell Phase I: Darell Score: 9    Darell Phase II: Darell Score: 10      Anesthesia Post Evaluation    Patient location during evaluation: bedside  Patient participation: complete - patient participated  Level of consciousness: awake and alert  Pain score: 3  Airway patency: patent  Nausea & Vomiting: no vomiting and no nausea  Complications: no  Cardiovascular status: hemodynamically stable  Respiratory status: acceptable and room air  Comments: S/P vaginal delivery 3-10-23 with epidural analgesia. No reported or apparent anesthesia complications. VSS.     BP: 139/86, Pain 0-10: Pain Level: 3, Location: abdomen;

## 2023-03-11 NOTE — FLOWSHEET NOTE
Pt. Informed RN she took her own synthroid 175 mcg at 0530. RN asked Pt. To take hospital supply for tomorrow. Pt. Verbalized understanding.

## 2023-03-11 NOTE — PLAN OF CARE
Problem: Vaginal Birth or  Section  Goal: Fetal and maternal status remain reassuring during the birth process  Description:  Birth OB-Pregnancy care plan goal which identifies if the fetal and maternal status remain reassuring during the birth process  3/10/2023 1952 by Paty Beckford RN  Outcome: Progressing  3/10/2023 1903 by Savannah Garcia RN  Outcome: Progressing     Problem: Postpartum  Goal: Experiences normal postpartum course  Description:  Postpartum OB-Pregnancy care plan goal which identifies if the mother is experiencing a normal postpartum course  3/10/2023 1952 by Paty Beckford RN  Outcome: Progressing  3/10/2023 1903 by Savannah Garcia RN  Outcome: Progressing  Goal: Appropriate maternal -  bonding  Description:  Postpartum OB-Pregnancy care plan goal which identifies if the mother and  are bonding appropriately  3/10/2023 1952 by Paty Beckford RN  Outcome: Progressing  3/10/2023 1903 by Savannah Garcia RN  Outcome: Progressing  Goal: Establishment of infant feeding pattern  Description:  Postpartum OB-Pregnancy care plan goal which identifies if the mother is establishing a feeding pattern with their   3/10/2023 1952 by Paty Beckford RN  Outcome: Progressing  3/10/2023 1903 by Savannah Garcia RN  Outcome: Progressing  Goal: Incisions, wounds, or drain sites healing without S/S of infection  3/10/2023 1952 by Paty Beckford RN  Outcome: Progressing  3/10/2023 1903 by Savannah Garcia RN  Outcome: Progressing     Problem: Pain  Goal: Verbalizes/displays adequate comfort level or baseline comfort level  3/10/2023 1952 by Paty Beckford RN  Outcome: Progressing  3/10/2023 1903 by Savannah Garcia RN  Outcome: Progressing     Problem: Infection - Adult  Goal: Absence of infection at discharge  3/10/2023 1952 by Paty Beckford RN  Outcome: Progressing  3/10/2023 1903 by Chinyere Sinha RN  Outcome: Progressing  Goal: Absence of infection during hospitalization  3/10/2023 1952 by Jose Heaton RN  Outcome: Progressing  3/10/2023 1903 by Chinyere Sinha RN  Outcome: Progressing  Goal: Absence of fever/infection during anticipated neutropenic period  3/10/2023 1952 by Jose Heaton RN  Outcome: Progressing  3/10/2023 1903 by Chinyere Sinha RN  Outcome: Progressing     Problem: Safety - Adult  Goal: Free from fall injury  3/10/2023 1952 by Jose Heaton RN  Outcome: Progressing  3/10/2023 1903 by Chinyere Sinha RN  Outcome: Progressing     Problem: Discharge Planning  Goal: Discharge to home or other facility with appropriate resources  3/10/2023 1952 by Jose Heaton RN  Outcome: Progressing  3/10/2023 1903 by Chinyere Sinha RN  Outcome: Progressing     Problem: Chronic Conditions and Co-morbidities  Goal: Patient's chronic conditions and co-morbidity symptoms are monitored and maintained or improved  3/10/2023 1952 by Jose Heaton RN  Outcome: Progressing  3/10/2023 1903 by Chinyere Sinha RN  Outcome: Progressing

## 2023-03-11 NOTE — PROGRESS NOTES
RN remained at bedside throughout pushing. EFM continuously assessed. Vaginal delivery of viable infant A 1606, baby B 1609.

## 2023-03-12 VITALS
TEMPERATURE: 97.9 F | SYSTOLIC BLOOD PRESSURE: 123 MMHG | OXYGEN SATURATION: 98 % | HEART RATE: 73 BPM | RESPIRATION RATE: 16 BRPM | DIASTOLIC BLOOD PRESSURE: 62 MMHG

## 2023-03-12 PROCEDURE — 6370000000 HC RX 637 (ALT 250 FOR IP): Performed by: OBSTETRICS & GYNECOLOGY

## 2023-03-12 RX ADMIN — LEVOTHYROXINE SODIUM 175 MCG: 0.12 TABLET ORAL at 07:29

## 2023-03-12 RX ADMIN — IBUPROFEN 800 MG: 800 TABLET, FILM COATED ORAL at 07:32

## 2023-03-12 RX ADMIN — BUPROPION HYDROCHLORIDE 150 MG: 150 TABLET, EXTENDED RELEASE ORAL at 08:55

## 2023-03-12 RX ADMIN — ACETAMINOPHEN 1000 MG: 500 TABLET ORAL at 00:33

## 2023-03-12 ASSESSMENT — PAIN - FUNCTIONAL ASSESSMENT: PAIN_FUNCTIONAL_ASSESSMENT: ACTIVITIES ARE NOT PREVENTED

## 2023-03-12 ASSESSMENT — PAIN DESCRIPTION - LOCATION: LOCATION: ABDOMEN;BACK

## 2023-03-12 ASSESSMENT — PAIN SCALES - GENERAL
PAINLEVEL_OUTOF10: 2
PAINLEVEL_OUTOF10: 2

## 2023-03-12 ASSESSMENT — PAIN DESCRIPTION - DESCRIPTORS: DESCRIPTORS: CRAMPING

## 2023-03-12 NOTE — FLOWSHEET NOTE
Discharge Phone Call    Patient Name: Дмитрий Parks     [facility] Care Provider: Alvarado Lara MD Discharge Date: 3/12/2023    Disposition of baby:    Phone Number: [phone] (home)     Attempts to Contact:  Date:    Caller  Date:    Caller  Date:    Caller    Information for the patient's :  Joe Hagans Girl Wynetta Canavan [[id number]]   Delivery Method: Vaginal, Spontaneous   Information for the patient's :  Lewiseasa Floras Girl Deborah Agudelo [[id number]]   Delivery Method: Vaginal, Spontaneous     1. Now that you are at home is your pain being well controlled? Y/N   If no, instruct to call       provider. 2. Are you breastfeeding? Y/N    Do you need any extra support from our lactation staff? Y/N    If yes, provide number for lactation. 3. Have you made or already had your first appointment with the baby's doctor? Y/N   If no, do      you know when to schedule it? Y/N    4. Have you scheduled your follow-up appointment? Y/N  If no, do you know when to schedule       it? Y/N   If no, they can find it on printed discharge instructions. 5. Did staff discuss safe sleep during your stay? Y/N   6. Did we explain things in a way you could understand? Y/N  7. Were we respectful of your preferences for labor and birth and include you in the plan of       care? Y/N  If no, please explain _______________________________________________  8. Is there anyone in particular you would like to mention who provided care for you? _______      _________________________________________________________________________     9. Were you given a Post-Birth Warning Signs handout? Y/N  Do you have it somewhere      easily accessible? Y/N  If no, please send them a copy and ask them to put it somewhere      easily found. 10. Have you been crying excessively, having anger or mood swings that feel out of control, or       feel like you can't cope with caring for yourself or baby?  Y/N   If yes, they may be showing       signs of postpartum depression and should call provider. There is also a        depression test on page C5 in their discharge booklet they can take. 13. Do you have any other questions or concerns I can address today?  Y/N  ______________      _________________________________________________________________________    Information provided during call :_________________________________________________  ___________________________________________________________________________    Call completed by:____________________________    Date:_________ Time:___________

## 2023-03-12 NOTE — FLOWSHEET NOTE
Discharge Phone Call    Patient Name: Norma Hoff     [facility] Care Provider: Carlos Hoffman MD Discharge Date: 3/12/2023    Disposition of baby:    Phone Number: [phone] (home)     Attempts to Contact:  Date:    Caller  Date:    Caller  Date:    Caller    Information for the patient's :  Mark Mukherjee [[id number]]   Delivery Method: Vaginal, Spontaneous   Information for the patient's :  Mark Scott Jun [employer] [[id number]]   Delivery Method: Vaginal, Spontaneous     1. Now that you are at home is your pain being well controlled? Y/N   If no, instruct to call       provider. 2. Are you breastfeeding? Y/N    Do you need any extra support from our lactation staff? Y/N    If yes, provide number for lactation. 3. Have you made or already had your first appointment with the baby's doctor? Y/N   If no, do      you know when to schedule it? Y/N    4. Have you scheduled your follow-up appointment? Y/N  If no, do you know when to schedule       it? Y/N   If no, they can find it on printed discharge instructions. 5. Did staff discuss safe sleep during your stay? Y/N   6. Did we explain things in a way you could understand? Y/N  7. Were we respectful of your preferences for labor and birth and include you in the plan of       care? Y/N  If no, please explain _______________________________________________  8. Is there anyone in particular you would like to mention who provided care for you? _______      _________________________________________________________________________     9. Were you given a Post-Birth Warning Signs handout? Y/N  Do you have it somewhere      easily accessible? Y/N  If no, please send them a copy and ask them to put it somewhere      easily found. 10. Have you been crying excessively, having anger or mood swings that feel out of control, or       feel like you can't cope with caring for yourself or baby?  Y/N   If yes, they may be showing       signs of postpartum depression and should call provider. There is also a        depression test on page C5 in their discharge booklet they can take. 13. Do you have any other questions or concerns I can address today?  Y/N  ______________      _________________________________________________________________________    Information provided during call :_________________________________________________  ___________________________________________________________________________    Call completed by:____________________________    Date:_________ Time:___________

## 2023-03-12 NOTE — DISCHARGE INSTRUCTIONS
Thank you for the opportunity to care for you and your family. We hope that you are happy with the care we provided during your stay in the [facility]. We want to ensure that you have the help you need when you leave the hospital. If there is anything we can assist you with, please let us know. Breastfeeding mothers may contact our lactation specialists with any problems or questions. [facility] lactation services phone number is [phone]. Leave a message and your call will be returned. Please refer to the information provided in the postpartum care booklet. The following are warning signs to remember. Call 911 if you have:    Chest pain or pressure  Shortness of breath, even at rest  Thoughts of harming yourself or others  Seizures    Call your healthcare provider if you have:    Temperature of 100.4 degrees or higher  Stitches that are not healing        -- Swelling, bleeding, drainage, foul odor, redness or warmth in/around your           stitches, staples, or incision (scar)        -- Bad smelling blood or discharge from the vagina  Vaginal bleeding that has increased         -- Soaking through one pad in an hour        -- You are passing clots larger than the size of a lemon  Red, warm tender area(s) in your breast or calf  Headache that does not get better, even after taking medicine; or headache with vision changes    Remember to notify all healthcare providers from your date of delivery to up to one year after giving birth! CARING FOR YOURSELF        DIET/ACTIVITY    Eat a well balanced diet focusing on foods high in fiber and protein. Drink plenty of fluids, especially water. To avoid constipation you may take a mild stool softener as recommended by your doctor or midwife. Gradually increase your activity. Resume an exercise regime only after being advised by your doctor or midwife. When sitting or lying down, keep your legs elevated to reduce swelling.   Avoid lifting anything heavier than your baby or a gallon of milk. Avoid driving for two weeks or while taking narcotics. No sexual intercourse for 6 weeks, or until advised by your OB provider. Nothing in the vagina: intercourse, tampons or douching. Be prepared to discuss family planning at your follow up OB visit. If your feel tired and have a lack of energy, you may continue to take your prenatal vitamins. Nap when your baby naps to catch on sleep. EMOTIONS    You may feel carr, sad, teary and overwhelmed. Contact your OB provider if you think you may be showing signs of postpartum depression. Please refer to the Katlyn 1898 tab in your discharge binder. If your baby will not stop crying, contact another adult to help or place the baby in its crib on its back and take a break. Never shake your baby! JALYN CARE     Vaginal bleeding will decrease in amount over the next few weeks. Cleanse your perineum from front to back using the jalyn-bottle after toileting until bleeding stops. You will notice that as your activity increases, your flow may also increase. This is a sign that you need to rest more often. Call your OB provider if you are saturating more than 1 maxi pad an hour and resting does not help. If used, stiches will dissolve in 4-6 weeks. To ease pain or swelling, use prescribed medications properly or use a sitz bath, if ordered. Kegel exercises will help to restore bladder control. BREAST CARE    FOR BREASTFEEDING MOMS:    If you become engorged, feeding may be more difficult or painful in 1 to 2 days. You may find it helpful to hand express some milk so that the infant can latch on more easily. While breastfeeding continue to take your prenatal vitamins as directed. Refer to the breastfeeding information in the discharge binder. FOR NON-BREASTFEEDING MOMS:    You may apply ice packs to your breasts over your bra for 20 minutes at a time for comfort.   Do not express breast milk.  Avoid stimulation to your breasts. When showering, allow the water to strike only your back. Wear a good fitting bra, such as a sports bra, until your milk dries up    [address]    Your abdomen is tender to the touch or you have pain that cannot be relieved. Flu-like symptoms such as achy muscles and joints or you are experiencing extreme weakness or dizziness. Persistent burning or increased urgency in urination. LITERATURE PROVIDED    For Moms and Those Who Care About Them  Your [employer] Healthy Start, Grow Smart  Breastfeeding Best for [place], [place] for Mom. [address] Information About Universal [employer] Screening  Controlling pain. I have received the educational material listed above. The  Channel has provided me with the opportunity to view instructional videos pertaining to infant care and the care of myself. I verify that I have received the above information and have no further questions and feel confident to care for myself and my baby. For more information about postpartum care, baby care and feeding, create a [facility] My Chart account, sign in and search using the magnifying glass, typing in postpartum, breast feeding or formula feeding. [email or web address]

## 2023-03-12 NOTE — DISCHARGE SUMMARY
Department of Obstetrics and Gynecology  Postpartum Discharge Summary      Admit Date: 3/10/2023    Admit Diagnosis: Dichorionic diamniotic twin pregnancy in third trimester [O30.043]    Discharge Date: 23    Condition at Discharge: good    Discharge Diagnoses: spontaneous vaginal delivery    Discharge Disposition:  Home    Service: Obstetrics    Postpartum complications: none     Hospital Course: uncomplicated    Jasper Data:  Information for the patient's :  Jess Scott Wagner Comment [[id number]]      Information for the patient's :  Jess Ferrer [[id number]]      Weight   Information for the patient's :  Jess Scott Wagner Comment [[id number]]      Information for the patient's :  Jess Ferrer [[id number]]      Apgars   Information for the patient's :  Jess Scott Wagner Comment [[id number]]      Information for the patient's :  Jess Ferrer [[id number]]       Disposition of Baby:  Home with mother      Current Discharge Medication List        CONTINUE these medications which have NOT CHANGED    Details   folic acid (FOLVITE) 1 MG tablet Take 1 mg by mouth daily      buPROPion (WELLBUTRIN XL) 150 MG extended release tablet Take 150 mg by mouth every morning      Prenatal MV-Min-Fe Fum-FA-DHA (PRENATAL 1 PO) Take by mouth      levothyroxine (SYNTHROID) 88 MCG tablet Take 175 mcg by mouth Daily           STOP taking these medications       aspirin 81 MG EC tablet Comments:   Reason for Stopping: Follow-up 6 weeks in office.             Electronically signed by Khurram Sands MD on 3/12/2023 at 11:02 AM

## 2023-03-12 NOTE — PROGRESS NOTES
Department of Obstetrics and Gynecology  Labor and Delivery  Attending Post Partum Progress Note      SUBJECTIVE:  No probs. Cramping that is alleviated with ibuprofen. Lochia normal. Infants are well. OBJECTIVE:      Vitals:  /62   Pulse 73   Temp 97.9 °F (36.6 °C) (Oral)   Resp 16   LMP 2022   SpO2 98%   Breastfeeding Unknown     ABDOMEN:  FFNT  GENITAL/URINARY:  deferred    DATA:    CBC:    Lab Results   Component Value Date/Time    WBC 7.3 2023 06:07 AM    RBC 3.28 2023 06:07 AM    HGB 10.8 2023 06:07 AM    HCT 31.2 2023 06:07 AM    MCV 95.2 2023 06:07 AM    RDW 14.4 2023 06:07 AM     2023 06:07 AM       ASSESSMENT & PLAN:      IMP:  PPD#2 S/P  of twins, doing well. PLAN:  Home. F/U 6 weeks in office. Declines Rx. Instructed.     Debora Mathur MD

## 2023-03-12 NOTE — PLAN OF CARE
Problem: Vaginal Birth or  Section  Goal: Fetal and maternal status remain reassuring during the birth process  Description:  Birth OB-Pregnancy care plan goal which identifies if the fetal and maternal status remain reassuring during the birth process  3/12/2023 1129 by Angella Malagon RN  Outcome: Completed  3/12/2023 0723 by Angella Malagon RN  Outcome: Progressing

## 2023-03-15 PROBLEM — O47.00 PRETERM CONTRACTIONS: Status: ACTIVE | Noted: 2023-03-15

## 2023-06-21 NOTE — PLAN OF CARE
Reason for Call:  Appointment Request    Order requesting this type of appt:  Routine PSG, all night titration, with ABG protocol.     Requesting provider: Dr. Mohsen Goldman    Reason patient unable to be scheduled: Needs to be scheduled by clinic    When does patient want to be seen/preferred time: as able    Comments: current workflow for  sleep scheduling does not include scheduling for [place] clinic. Please assist the patient in scheduling this appointment    Could we send this information to you in [employer] or would you prefer to receive a phone call?:   Patient would prefer a phone call   Okay to leave a detailed message?: Yes at Cell number on file:    Telephone Information:   Mobile [phone]       Call taken on 6/21/2023 at 1:53 PM by Maria L Sifuentes       Problem: Vaginal Birth or  Section  Goal: Fetal and maternal status remain reassuring during the birth process  Description:  Birth OB-Pregnancy care plan goal which identifies if the fetal and maternal status remain reassuring during the birth process  Outcome: Progressing

## 2023-08-08 ENCOUNTER — HOSPITAL ENCOUNTER (EMERGENCY)
Age: 38
Discharge: HOME | End: 2023-08-09
Attending: EMERGENCY MEDICINE
Payer: MEDICAID

## 2023-08-08 ENCOUNTER — APPOINTMENT (OUTPATIENT)
Dept: CT IMAGING | Age: 38
End: 2023-08-08
Payer: MEDICAID

## 2023-08-08 DIAGNOSIS — R10.31 RIGHT LOWER QUADRANT ABDOMINAL PAIN: Primary | ICD-10-CM

## 2023-08-08 LAB
ALBUMIN SERPL-MCNC: 4.3 G/DL (ref 3.4–5)
ALBUMIN/GLOB SERPL: 1.6 {RATIO} (ref 1.1–2.2)
ALP SERPL-CCNC: 54 U/L (ref 40–129)
ALT SERPL-CCNC: 22 U/L (ref 10–40)
ANION GAP SERPL CALCULATED.3IONS-SCNC: 12 MMOL/L (ref 3–16)
AST SERPL-CCNC: 14 U/L (ref 15–37)
BASOPHILS # BLD: 0.1 K/UL (ref 0–0.2)
BASOPHILS NFR BLD: 0.6 %
BILIRUB SERPL-MCNC: 0.3 MG/DL (ref 0–1)
BILIRUB UR QL STRIP.AUTO: NEGATIVE
BUN SERPL-MCNC: 12 MG/DL (ref 7–20)
CALCIUM SERPL-MCNC: 9.2 MG/DL (ref 8.3–10.6)
CHLORIDE SERPL-SCNC: 100 MMOL/L (ref 99–110)
CLARITY UR: CLEAR
CO2 SERPL-SCNC: 23 MMOL/L (ref 21–32)
COLOR UR: YELLOW
CREAT SERPL-MCNC: 0.7 MG/DL (ref 0.6–1.1)
DEPRECATED RDW RBC AUTO: 14 % (ref 12.4–15.4)
EOSINOPHIL # BLD: 0.2 K/UL (ref 0–0.6)
EOSINOPHIL NFR BLD: 1.8 %
GFR SERPLBLD CREATININE-BSD FMLA CKD-EPI: >60 ML/MIN/{1.73_M2}
GLUCOSE SERPL-MCNC: 107 MG/DL (ref 70–99)
GLUCOSE UR STRIP.AUTO-MCNC: NEGATIVE MG/DL
HCG SERPL QL: NEGATIVE
HCT VFR BLD AUTO: 39.4 % (ref 36–48)
HGB BLD-MCNC: 13.6 G/DL (ref 12–16)
HGB UR QL STRIP.AUTO: NEGATIVE
KETONES UR STRIP.AUTO-MCNC: NEGATIVE MG/DL
LEUKOCYTE ESTERASE UR QL STRIP.AUTO: NEGATIVE
LIPASE SERPL-CCNC: 18 U/L (ref 13–60)
LYMPHOCYTES # BLD: 2.1 K/UL (ref 1–5.1)
LYMPHOCYTES NFR BLD: 17.8 %
MCH RBC QN AUTO: 33.4 PG (ref 26–34)
MCHC RBC AUTO-ENTMCNC: 34.5 G/DL (ref 31–36)
MCV RBC AUTO: 96.9 FL (ref 80–100)
MONOCYTES # BLD: 0.7 K/UL (ref 0–1.3)
MONOCYTES NFR BLD: 5.6 %
NEUTROPHILS # BLD: 8.9 K/UL (ref 1.7–7.7)
NEUTROPHILS NFR BLD: 74.2 %
NITRITE UR QL STRIP.AUTO: NEGATIVE
PH UR STRIP.AUTO: 6 [PH] (ref 5–8)
PLATELET # BLD AUTO: 241 K/UL (ref 135–450)
PMV BLD AUTO: 8.9 FL (ref 5–10.5)
POTASSIUM SERPL-SCNC: 3.8 MMOL/L (ref 3.5–5.1)
PROT SERPL-MCNC: 7 G/DL (ref 6.4–8.2)
PROT UR STRIP.AUTO-MCNC: NEGATIVE MG/DL
RBC # BLD AUTO: 4.07 M/UL (ref 4–5.2)
SODIUM SERPL-SCNC: 135 MMOL/L (ref 136–145)
SP GR UR STRIP.AUTO: 1.02 (ref 1–1.03)
UA COMPLETE W REFLEX CULTURE PNL UR: NORMAL
UA DIPSTICK W REFLEX MICRO PNL UR: NORMAL
URN SPEC COLLECT METH UR: NORMAL
UROBILINOGEN UR STRIP-ACNC: 0.2 E.U./DL
WBC # BLD AUTO: 12 K/UL (ref 4–11)

## 2023-08-08 PROCEDURE — 99285 EMERGENCY DEPT VISIT HI MDM: CPT

## 2023-08-08 PROCEDURE — 6370000000 HC RX 637 (ALT 250 FOR IP): Performed by: PHYSICIAN ASSISTANT

## 2023-08-08 PROCEDURE — 6360000004 HC RX CONTRAST MEDICATION: Performed by: PHYSICIAN ASSISTANT

## 2023-08-08 PROCEDURE — 74177 CT ABD & PELVIS W/CONTRAST: CPT

## 2023-08-08 PROCEDURE — 84703 CHORIONIC GONADOTROPIN ASSAY: CPT

## 2023-08-08 PROCEDURE — 80053 COMPREHEN METABOLIC PANEL: CPT

## 2023-08-08 PROCEDURE — 83690 ASSAY OF LIPASE: CPT

## 2023-08-08 PROCEDURE — 81003 URINALYSIS AUTO W/O SCOPE: CPT

## 2023-08-08 PROCEDURE — 85025 COMPLETE CBC W/AUTO DIFF WBC: CPT

## 2023-08-08 RX ADMIN — LIDOCAINE HYDROCHLORIDE: 20 SOLUTION ORAL; TOPICAL at 23:57

## 2023-08-08 RX ADMIN — IOPAMIDOL 75 ML: 755 INJECTION, SOLUTION INTRAVENOUS at 23:05

## 2023-08-08 ASSESSMENT — PAIN SCALES - GENERAL: PAINLEVEL_OUTOF10: 4

## 2023-08-08 ASSESSMENT — PAIN - FUNCTIONAL ASSESSMENT: PAIN_FUNCTIONAL_ASSESSMENT: 0-10

## 2023-08-08 ASSESSMENT — PAIN DESCRIPTION - LOCATION: LOCATION: ABDOMEN

## 2023-08-09 VITALS
SYSTOLIC BLOOD PRESSURE: 161 MMHG | OXYGEN SATURATION: 96 % | HEART RATE: 77 BPM | HEIGHT: 66 IN | WEIGHT: 270 LBS | DIASTOLIC BLOOD PRESSURE: 87 MMHG | RESPIRATION RATE: 18 BRPM | BODY MASS INDEX: 43.39 KG/M2 | TEMPERATURE: 98.2 F

## 2023-08-09 PROCEDURE — 6370000000 HC RX 637 (ALT 250 FOR IP): Performed by: EMERGENCY MEDICINE

## 2023-08-09 RX ORDER — OXYCODONE HYDROCHLORIDE AND ACETAMINOPHEN 5; 325 MG/1; MG/1
1 TABLET ORAL ONCE
Status: COMPLETED | OUTPATIENT
Start: 2023-08-09 | End: 2023-08-09

## 2023-08-09 RX ORDER — OXYCODONE HYDROCHLORIDE AND ACETAMINOPHEN 5; 325 MG/1; MG/1
1-2 TABLET ORAL EVERY 8 HOURS PRN
Qty: 5 TABLET | Refills: 0 | Status: SHIPPED | OUTPATIENT
Start: 2023-08-09 | End: 2023-08-12

## 2023-08-09 RX ADMIN — OXYCODONE HYDROCHLORIDE AND ACETAMINOPHEN 1 TABLET: 5; 325 TABLET ORAL at 01:26

## 2023-08-09 ASSESSMENT — PAIN SCALES - GENERAL: PAINLEVEL_OUTOF10: 6

## 2023-08-09 ASSESSMENT — PAIN DESCRIPTION - LOCATION: LOCATION: ABDOMEN

## 2023-08-09 NOTE — ED PROVIDER NOTES
I independently performed a history and physical on Inna Chan. All diagnostic, treatment, and disposition decisions were made by myself in conjunction with the advanced practice provider. For further details of Lilian Rendon emergency department encounter, please see SABAS Adams's documentation. Chief Complaint   Patient presents with    Abdominal Pain     Pt to ED for c/o right lower abdominal pain that started this morning. Patient complains of right lower quadrant pain that began this morning and has remained fairly constant. It is moderate to severe in intensity. Nonradiating. She has had some nausea but no vomiting. She has never had pain quite like this before and has had kidney stones and states this is different. On exam she has positive McBurney's point tenderness and Rovsing sign but no rigidity or guarding. Labs Reviewed   CBC WITH AUTO DIFFERENTIAL - Abnormal; Notable for the following components:       Result Value    WBC 12.0 (*)     Neutrophils Absolute 8.9 (*)     All other components within normal limits   COMPREHENSIVE METABOLIC PANEL W/ REFLEX TO MG FOR LOW K - Abnormal; Notable for the following components:    Sodium 135 (*)     Glucose 107 (*)     AST 14 (*)     All other components within normal limits   HCG, SERUM, QUALITATIVE   URINALYSIS WITH REFLEX TO CULTURE   LIPASE     CT ABDOMEN PELVIS W IV CONTRAST Additional Contrast? None   Final Result   1. Appendix is unremarkable. No focal abnormality in the right lower   quadrant. 2. Partially calcified nodule to the left of the uterus which may be a   degenerative fibroid. Ultrasound of the pelvis should be considered. Point-of-care ultrasound performed by me:  No results found. I personally saw this patient and performed a substantive portion of the visit including all aspects of the medical decision making.     MEDICAL DECISION MAKING  History From: History from :

## 2023-08-09 NOTE — ED PROVIDER NOTES
[facility] Emergency Department    CHIEF COMPLAINT  Abdominal Pain (Pt to ED for c/o right lower abdominal pain that started this morning. )      SHARED SERVICE VISIT  I have seen and evaluated this patient with my supervising physician, Dr. Jerrica Collier. HISTORY OF PRESENT ILLNESS  Joyce Hicks is a 40 y.o. female who presents to the ED complaining of lower right quadrant abdominal pain. She says this morning she woke up was experiencing mid stomach dull achy pain that has since radiated to her right lower quadrant. She says she experiences consistent dull achy pain with waves of sharp stabbing pain in that right lower quadrant. She has experienced some nausea, however there is been no vomiting or diarrhea. She has not eaten anything since pain started, however is able to tolerate p.o. liquids. She currently denies any headaches, body aches, fevers or chills. She denies any coughing or sneezing. She denies any sore throats. She denies any chest pain, shortness of breath, dyspnea on exertion. She denies any urinary symptoms. She denies any new onset back pain. She denies any recent travel or sick contacts. No other complaints, modifying factors or associated symptoms. Nursing notes reviewed. Past Medical History:   Diagnosis Date    Anxiety and depression     takes wellbutrin    Fibroid uterus     8 cm, @ cervix    Skin sore     pt has raised \"boils\" on abdomen, states not MRSA, one is reddened, all others are scarred    Thyroid disease      History reviewed. No pertinent surgical history. History reviewed. No pertinent family history.   Social History     Socioeconomic History    Marital status:      Spouse name: Not on file    Number of children: Not on file    Years of education: Not on file    Highest education level: Not on file   Occupational History    Not on file   Tobacco Use    Smoking status: Some Days     Packs/day: 1.00     Years: 9.00

## 2023-08-21 ENCOUNTER — TELEPHONE (OUTPATIENT)
Dept: CASE MANAGEMENT | Age: 38
End: 2023-08-21

## 2023-08-21 NOTE — TELEPHONE ENCOUNTER
Imaging report CT Abd 8/8/23 with f/u imaging recommendations sent to Lisa Lane MD    [address] [facility][email or web address]

## 2024-05-06 ENCOUNTER — HOSPITAL ENCOUNTER (OUTPATIENT)
Dept: ULTRASOUND IMAGING | Age: 39
Discharge: HOME | End: 2024-05-06
Payer: MEDICAID

## 2024-05-06 DIAGNOSIS — E03.9 ACQUIRED HYPOTHYROIDISM: ICD-10-CM

## 2024-05-06 PROCEDURE — 76536 US EXAM OF HEAD AND NECK: CPT

## 2024-08-01 NOTE — PROGRESS NOTES
Inna Chan    Age 38 y.o.    female    1985    MRN [id number]    8/19/2024  Arrival Time_____________  OR Time____________60 Min     Procedure(s):  DILATION AND CURETTAGE, VIDEO  HYSTEROSCOPY, ENDOMETRIAL ABLATION                      General   Surgeon(s):  Griffin Snyder, MD      DAY ADMIT ___  SDS/OP ___  OUTPT IN BED ___        Phone [phone] (home)                  PCP _____________________ Phone_________________ Epic ( ) Epic CE ( ) Appt ________    NOTES: _________________________________________ Consult/Cardio _______________    ____________________________________________________________________________    ____________________________________________________________________________  PAT APPT DATE:________ TIME: ________  FAXED QAD: _______  (__) H&P w/ Hospitalist    (__) PAT orders in EPIC    (__) Meet with PAT nurse  __________________________________________________________________________  Preop Nurse phone screen complete: _____________  (__) CBC     (__) W/ DIFF ___________  (__) CT CHEST  __________   (__) Hgb A1C    ___________  (__) CHEST X RAY   __________  (__) LIPID PROFILE  ___________  (__) EKG   __________  (__) PT-INR / APTT  ___________  (__) PFT's   __________  (__) BMP   ___________  (__) CAROTIDS  __________  (__) CMP   ___________  (__) VEIN MAPPING  __________  (__) U/A   ___________  ( X ) HISTORY & PHYSICAL __________  (__) URINE C & S  ___________  (__) CARDIAC CLEARANCE __________  (__) U/A W/ FLEX  ___________  (__) PULM. CLEARANCE __________  (__) SERUM PREGNANCY ___________  (__) Preop Orders in EPIC __________  (__) TYPE & SCREEN __________repeat ( ) (__)  __________________ __________  (__) Albumin   ___________  (__)  __________________ __________  (__) TRANSFERRIN  ___________  (__)  __________________ __________  (__) LIVER PROFILE  ___________  (__) URINE PREG DOS __________  (__) MRSA NASAL SWAB ___________  (__) BLOOD SUGAR DOS __________  (__) SED

## 2024-08-14 RX ORDER — ERGOCALCIFEROL 1.25 MG/1
1 CAPSULE ORAL WEEKLY
COMMUNITY

## 2024-08-14 RX ORDER — PSYLLIUM SEED (WITH DEXTROSE)
POWDER (GRAM) ORAL
COMMUNITY

## 2024-08-14 NOTE — PROGRESS NOTES
Surgery Date and Time: 8/19/24 @ 11:15 am   Arrival Time:  09:15 am    The instructions given when and if a patient needs to stop oral intake prior to surgery varies. Follow the instructions you were given by your    Surgeon or RN during the Pre-op call.       __X__Nothing to eat or to drink after Midnight the night before the surgery. NO gum, mints, candy or ice chips day of surgery.                  Only take the following medications with a small sip of water the morning of surgery:  levothyroxine          Aspirin, Ibuprofen, Advil, Naproxen, Vitamin E and other Anti-inflammatory products and supplements should be stopped for 5 -7days before surgery      or as directed by your physician.     - Do not smoke or vape, and do not drink any alcoholic beverages 24 hours prior to surgery, this includes NA Beer. Refrain from using any recreational drugs,     including non-prescribed prescription drugs.     -You may brush your teeth and gargle the morning of surgery.  DO NOT SWALLOW WATER.    -You MUST plan for a responsible adult to stay on site while you are here and take you home after your surgery. You will not be allowed to leave alone or drive               yourself home. It is requested someone stay with you the first 24 hrs. Your surgery will be cancelled if you do not have a ride home with a responsible adult.    -A parent/legal guardian must accompany a child scheduled for surgery and plan to stay at the hospital until the child is discharged.  Please do not bring other                children with you.    -Please wear simple, loose-fitting clothing to the hospital. Do not bring valuables (money, credit cards, checkbooks, etc.) Do not wear any makeup (including                no eye makeup) and no nail polish if applicable.             - DO NOT wear any jewelry or body piercings day of surgery.  All body piercing jewelry must be removed.             - If you have dentures they will be removed before going to

## 2024-08-18 ENCOUNTER — ANESTHESIA EVENT (OUTPATIENT)
Dept: OPERATING ROOM | Age: 39
End: 2024-08-18
Payer: MEDICAID

## 2024-08-19 ENCOUNTER — HOSPITAL ENCOUNTER (OUTPATIENT)
Age: 39
Setting detail: OUTPATIENT SURGERY
Discharge: HOME | End: 2024-08-19
Attending: OBSTETRICS & GYNECOLOGY | Admitting: OBSTETRICS & GYNECOLOGY
Payer: MEDICAID

## 2024-08-19 ENCOUNTER — ANESTHESIA (OUTPATIENT)
Dept: OPERATING ROOM | Age: 39
End: 2024-08-19
Payer: MEDICAID

## 2024-08-19 VITALS
BODY MASS INDEX: 47.09 KG/M2 | OXYGEN SATURATION: 97 % | SYSTOLIC BLOOD PRESSURE: 129 MMHG | DIASTOLIC BLOOD PRESSURE: 67 MMHG | HEIGHT: 66 IN | WEIGHT: 293 LBS | TEMPERATURE: 97.7 F | RESPIRATION RATE: 18 BRPM | HEART RATE: 72 BPM

## 2024-08-19 DIAGNOSIS — N92.0 MENORRHAGIA WITH REGULAR CYCLE: ICD-10-CM

## 2024-08-19 LAB — HCG UR QL: NEGATIVE

## 2024-08-19 PROCEDURE — 3700000000 HC ANESTHESIA ATTENDED CARE: Performed by: OBSTETRICS & GYNECOLOGY

## 2024-08-19 PROCEDURE — 2580000003 HC RX 258: Performed by: ANESTHESIOLOGY

## 2024-08-19 PROCEDURE — 3700000001 HC ADD 15 MINUTES (ANESTHESIA): Performed by: OBSTETRICS & GYNECOLOGY

## 2024-08-19 PROCEDURE — 7100000000 HC PACU RECOVERY - FIRST 15 MIN: Performed by: OBSTETRICS & GYNECOLOGY

## 2024-08-19 PROCEDURE — 2500000003 HC RX 250 WO HCPCS: Performed by: NURSE ANESTHETIST, CERTIFIED REGISTERED

## 2024-08-19 PROCEDURE — 2720000010 HC SURG SUPPLY STERILE: Performed by: OBSTETRICS & GYNECOLOGY

## 2024-08-19 PROCEDURE — 84703 CHORIONIC GONADOTROPIN ASSAY: CPT

## 2024-08-19 PROCEDURE — 3600000015 HC SURGERY LEVEL 5 ADDTL 15MIN: Performed by: OBSTETRICS & GYNECOLOGY

## 2024-08-19 PROCEDURE — 7100000011 HC PHASE II RECOVERY - ADDTL 15 MIN: Performed by: OBSTETRICS & GYNECOLOGY

## 2024-08-19 PROCEDURE — 7100000010 HC PHASE II RECOVERY - FIRST 15 MIN: Performed by: OBSTETRICS & GYNECOLOGY

## 2024-08-19 PROCEDURE — 2709999900 HC NON-CHARGEABLE SUPPLY: Performed by: OBSTETRICS & GYNECOLOGY

## 2024-08-19 PROCEDURE — 7100000001 HC PACU RECOVERY - ADDTL 15 MIN: Performed by: OBSTETRICS & GYNECOLOGY

## 2024-08-19 PROCEDURE — 88305 TISSUE EXAM BY PATHOLOGIST: CPT

## 2024-08-19 PROCEDURE — 3600000005 HC SURGERY LEVEL 5 BASE: Performed by: OBSTETRICS & GYNECOLOGY

## 2024-08-19 PROCEDURE — 6360000002 HC RX W HCPCS: Performed by: NURSE ANESTHETIST, CERTIFIED REGISTERED

## 2024-08-19 PROCEDURE — 2580000003 HC RX 258: Performed by: OBSTETRICS & GYNECOLOGY

## 2024-08-19 RX ORDER — ONDANSETRON 2 MG/ML
INJECTION INTRAMUSCULAR; INTRAVENOUS PRN
Status: DISCONTINUED | OUTPATIENT
Start: 2024-08-19 | End: 2024-08-19 | Stop reason: SDUPTHER

## 2024-08-19 RX ORDER — LIDOCAINE HYDROCHLORIDE 10 MG/ML
0.3 INJECTION, SOLUTION EPIDURAL; INFILTRATION; INTRACAUDAL; PERINEURAL
Status: DISCONTINUED | OUTPATIENT
Start: 2024-08-19 | End: 2024-08-19 | Stop reason: HOSPADM

## 2024-08-19 RX ORDER — SODIUM CHLORIDE 0.9 % (FLUSH) 0.9 %
5-40 SYRINGE (ML) INJECTION EVERY 12 HOURS SCHEDULED
Status: DISCONTINUED | OUTPATIENT
Start: 2024-08-19 | End: 2024-08-19 | Stop reason: HOSPADM

## 2024-08-19 RX ORDER — PROPOFOL 10 MG/ML
INJECTION, EMULSION INTRAVENOUS PRN
Status: DISCONTINUED | OUTPATIENT
Start: 2024-08-19 | End: 2024-08-19 | Stop reason: SDUPTHER

## 2024-08-19 RX ORDER — OXYCODONE HYDROCHLORIDE 5 MG/1
5 TABLET ORAL PRN
Status: DISCONTINUED | OUTPATIENT
Start: 2024-08-19 | End: 2024-08-19 | Stop reason: HOSPADM

## 2024-08-19 RX ORDER — FENTANYL CITRATE 50 UG/ML
INJECTION, SOLUTION INTRAMUSCULAR; INTRAVENOUS PRN
Status: DISCONTINUED | OUTPATIENT
Start: 2024-08-19 | End: 2024-08-19 | Stop reason: SDUPTHER

## 2024-08-19 RX ORDER — SODIUM CHLORIDE, SODIUM LACTATE, POTASSIUM CHLORIDE, CALCIUM CHLORIDE 600; 310; 30; 20 MG/100ML; MG/100ML; MG/100ML; MG/100ML
INJECTION, SOLUTION INTRAVENOUS CONTINUOUS
Status: DISCONTINUED | OUTPATIENT
Start: 2024-08-19 | End: 2024-08-19 | Stop reason: HOSPADM

## 2024-08-19 RX ORDER — SUCCINYLCHOLINE CHLORIDE 20 MG/ML
INJECTION INTRAMUSCULAR; INTRAVENOUS PRN
Status: DISCONTINUED | OUTPATIENT
Start: 2024-08-19 | End: 2024-08-19 | Stop reason: SDUPTHER

## 2024-08-19 RX ORDER — LIDOCAINE HYDROCHLORIDE 20 MG/ML
INJECTION, SOLUTION EPIDURAL; INFILTRATION; INTRACAUDAL; PERINEURAL PRN
Status: DISCONTINUED | OUTPATIENT
Start: 2024-08-19 | End: 2024-08-19 | Stop reason: SDUPTHER

## 2024-08-19 RX ORDER — SODIUM CHLORIDE, SODIUM LACTATE, POTASSIUM CHLORIDE, CALCIUM CHLORIDE 600; 310; 30; 20 MG/100ML; MG/100ML; MG/100ML; MG/100ML
INJECTION, SOLUTION INTRAVENOUS CONTINUOUS PRN
Status: COMPLETED | OUTPATIENT
Start: 2024-08-19 | End: 2024-08-19

## 2024-08-19 RX ORDER — IBUPROFEN 600 MG/1
600 TABLET ORAL EVERY 6 HOURS PRN
Qty: 25 TABLET | Refills: 0 | Status: SHIPPED | OUTPATIENT
Start: 2024-08-19

## 2024-08-19 RX ORDER — KETOROLAC TROMETHAMINE 30 MG/ML
INJECTION, SOLUTION INTRAMUSCULAR; INTRAVENOUS PRN
Status: DISCONTINUED | OUTPATIENT
Start: 2024-08-19 | End: 2024-08-19 | Stop reason: SDUPTHER

## 2024-08-19 RX ORDER — NALOXONE HYDROCHLORIDE 0.4 MG/ML
INJECTION, SOLUTION INTRAMUSCULAR; INTRAVENOUS; SUBCUTANEOUS PRN
Status: DISCONTINUED | OUTPATIENT
Start: 2024-08-19 | End: 2024-08-19 | Stop reason: HOSPADM

## 2024-08-19 RX ORDER — MIDAZOLAM HYDROCHLORIDE 1 MG/ML
INJECTION INTRAMUSCULAR; INTRAVENOUS PRN
Status: DISCONTINUED | OUTPATIENT
Start: 2024-08-19 | End: 2024-08-19 | Stop reason: SDUPTHER

## 2024-08-19 RX ORDER — OXYCODONE HYDROCHLORIDE 5 MG/1
10 TABLET ORAL PRN
Status: DISCONTINUED | OUTPATIENT
Start: 2024-08-19 | End: 2024-08-19 | Stop reason: HOSPADM

## 2024-08-19 RX ORDER — DEXAMETHASONE SODIUM PHOSPHATE 4 MG/ML
INJECTION, SOLUTION INTRA-ARTICULAR; INTRALESIONAL; INTRAMUSCULAR; INTRAVENOUS; SOFT TISSUE PRN
Status: DISCONTINUED | OUTPATIENT
Start: 2024-08-19 | End: 2024-08-19 | Stop reason: SDUPTHER

## 2024-08-19 RX ORDER — ONDANSETRON 2 MG/ML
4 INJECTION INTRAMUSCULAR; INTRAVENOUS EVERY 30 MIN PRN
Status: DISCONTINUED | OUTPATIENT
Start: 2024-08-19 | End: 2024-08-19 | Stop reason: HOSPADM

## 2024-08-19 RX ORDER — OXYCODONE HYDROCHLORIDE AND ACETAMINOPHEN 5; 325 MG/1; MG/1
1 TABLET ORAL EVERY 6 HOURS PRN
Qty: 2 TABLET | Refills: 0 | Status: SHIPPED | OUTPATIENT
Start: 2024-08-19 | End: 2024-08-24

## 2024-08-19 RX ORDER — SODIUM CHLORIDE 9 MG/ML
INJECTION, SOLUTION INTRAVENOUS PRN
Status: DISCONTINUED | OUTPATIENT
Start: 2024-08-19 | End: 2024-08-19 | Stop reason: HOSPADM

## 2024-08-19 RX ORDER — SODIUM CHLORIDE 0.9 % (FLUSH) 0.9 %
5-40 SYRINGE (ML) INJECTION PRN
Status: DISCONTINUED | OUTPATIENT
Start: 2024-08-19 | End: 2024-08-19 | Stop reason: HOSPADM

## 2024-08-19 RX ADMIN — ONDANSETRON 4 MG: 2 INJECTION INTRAMUSCULAR; INTRAVENOUS at 11:01

## 2024-08-19 RX ADMIN — SODIUM CHLORIDE, SODIUM LACTATE, POTASSIUM CHLORIDE, AND CALCIUM CHLORIDE: .6; .31; .03; .02 INJECTION, SOLUTION INTRAVENOUS at 10:00

## 2024-08-19 RX ADMIN — MIDAZOLAM 2 MG: 1 INJECTION INTRAMUSCULAR; INTRAVENOUS at 10:33

## 2024-08-19 RX ADMIN — KETOROLAC TROMETHAMINE 15 MG: 30 INJECTION, SOLUTION INTRAMUSCULAR; INTRAVENOUS at 10:59

## 2024-08-19 RX ADMIN — DEXAMETHASONE SODIUM PHOSPHATE 4 MG: 4 INJECTION, SOLUTION INTRAMUSCULAR; INTRAVENOUS at 11:01

## 2024-08-19 RX ADMIN — FENTANYL CITRATE 100 MCG: 50 INJECTION, SOLUTION INTRAMUSCULAR; INTRAVENOUS at 10:49

## 2024-08-19 RX ADMIN — SUCCINYLCHOLINE CHLORIDE 140 MG: 20 INJECTION, SOLUTION INTRAMUSCULAR; INTRAVENOUS at 10:41

## 2024-08-19 RX ADMIN — PROPOFOL 400 MG: 10 INJECTION, EMULSION INTRAVENOUS at 10:41

## 2024-08-19 RX ADMIN — LIDOCAINE HYDROCHLORIDE 80 MG: 20 INJECTION, SOLUTION EPIDURAL; INFILTRATION; INTRACAUDAL; PERINEURAL at 10:41

## 2024-08-19 ASSESSMENT — PAIN SCALES - GENERAL: PAINLEVEL_OUTOF10: 0

## 2024-08-19 ASSESSMENT — LIFESTYLE VARIABLES: SMOKING_STATUS: 1

## 2024-08-19 NOTE — PROGRESS NOTES
Pt brought to PACU. Report obtained from OR RN and anesthesia. Pt placed on monitor and O2 at  4L

## 2024-08-19 NOTE — BRIEF OP NOTE
Brief Postoperative Note      Patient: Inna Chan  YOB: 1985  MRN: [id number]    Date of Procedure: 8/19/2024    Pre-Op Diagnosis Codes:      * Menorrhagia with regular cycle [N92.0]    Post-Op Diagnosis: Same       Procedure(s):  DILATION AND CURETTAGE, VIDEO  HYSTEROSCOPY, ENDOMETRIAL ABLATION    Surgeon(s):  Servando Barry MD    Assistant:  Surgical Assistant: Renata Lawler    Anesthesia: General    Estimated Blood Loss (mL): Minimal    Complications: None    Specimens:   ID Type Source Tests Collected by Time Destination   A : ENDOMETRIAL CURETTINGS Tissue Endometrium SURGICAL PATHOLOGY Servando Barry MD 8/19/2024 1057        Implants:  * No implants in log *      Drains: * No LDAs found *    Findings:  Infection Present At Time Of Surgery (PATOS) (choose all levels that have infection present):  No infection present  Other Findings: none    Electronically signed by SERVANDO BARRY MD on 8/19/2024 at 11:08 AM

## 2024-08-19 NOTE — OP NOTE
[address] [address]                            OPERATIVE REPORT      PATIENT NAME: VALENTÍN CLEMENTS            : 1985  MED REC NO: [id number]                      ROOM: OR Pool  ACCOUNT NO: [id number]                       ADMIT DATE: 2024  PROVIDER: Griffin Snyder MD      DATE OF PROCEDURE:  2024    SURGEON:  Griffin Snyder MD    PREOPERATIVE DIAGNOSIS:  Menorrhagia.    POSTOPERATIVE DIAGNOSIS:  Menorrhagia.    PROCEDURES:  Diagnostic hysteroscopy, dilation and curettage, endometrial ablation with NovaSure.    ANESTHESIA:  General.    ESTIMATED BLOOD LOSS:  Minimal.    SPECIMEN:  Endometrial curettings.    FINDINGS:  Normal uterine cavity.    INDICATIONS AND CONSENT:  A 38-year-old, who presents for management of menorrhagia.  Endometrial biopsy was benign.  Ultrasound was unremarkable with the exception of a large pedunculated uterine fibroid.  After thorough counseling, the patient did wish to undergo endometrial ablation for menorrhagia.  It was felt that the pedunculated fibroid would not hinder an ablation.  Consent was obtained.  Questions were answered.    DESCRIPTION OF PROCEDURE:  The patient was taken to the OR on the , placed on the OR table in a supine position.  General anesthetic was administered.  The patient was prepped and draped in a sterile fashion.  Her bladder was emptied in a sterile fashion.  Exam under anesthesia prior noted a midposition uterus and a pedunculated fibroid, which had been stable in size and character.  Bladder was emptied with prepping and draping.    A weighted speculum was placed into the posterior vagina.  A single-tooth tenaculum was used to grasp the cervix.  Cervix was dilated to admit a diagnostic hysteroscope.  Hysteroscopy was performed.  Endometrium was lush, but otherwise unremarkable.  Both tubal ostia were seen.  Curettage was

## 2024-08-19 NOTE — ANESTHESIA POSTPROCEDURE EVALUATION
Department of Anesthesiology  Postprocedure Note    Patient: Inna Chan  MRN: [id number]  YOB: 1985  Date of evaluation: 8/19/2024    Procedure Summary       Date: 08/19/24 Room / Location: [address]    Anesthesia Start: 1036 Anesthesia Stop: 1117    Procedure: DILATION AND CURETTAGE, VIDEO  HYSTEROSCOPY, ENDOMETRIAL ABLATION (Vagina ) Diagnosis:       Menorrhagia with regular cycle      (Menorrhagia with regular cycle [N92.0])    Surgeons: Griffin Snyder MD Responsible Provider: Troy Gordon MD    Anesthesia Type: general ASA Status: 2            Anesthesia Type: No value filed.    Darell Phase I: Darell Score: 10    Darell Phase II: Darell Score: 10    Anesthesia Post Evaluation    Patient location during evaluation: PACU  Level of consciousness: awake  Airway patency: patent  Nausea & Vomiting: no vomiting  Cardiovascular status: blood pressure returned to baseline  Respiratory status: acceptable  Hydration status: stable  Pain management: adequate    No notable events documented.

## 2024-08-19 NOTE — H&P
I have reviewed the history and physical and examined the patient and find no relevant changes. I have reviewed with the patient and/or family the risks, benefits, and alternatives to the procedure.    MYESHA Snyder   Stimulant medications were re-prescribed.  The times were written in so that he can get these at camp.  Strattera was prescribed for a 90-day supply and the prescription cannot be changed until it is due to be refilled.  Therefore, a letter was written with the timing of the medication.  Nurse order forms were printed.

## 2024-08-19 NOTE — DISCHARGE INSTRUCTIONS
DILATATION & CURETTAGE AND/OR HYSTEROSCOPY AND/OR ABLATION      Discharge instructions for day surgery patients and family    1.  Since you may experience some intermittent light-headedness for the next several hours, we suggest you plan on bed rest or quiet relaxation this evening.  You must have a friend or relative stay with you tonight.  2.  Because of the sedation you have received it is recommended that you do not drive    motor vehicle, operate any kind of machinery, or sign any contractual agreement for 24 hours following  The procedure.  3.  You should not take any alcoholic beverages tonight and only take sleeping medication that has been specifically prescribed for you by your physician.  4.  Eat light for your first meal and then gradually progress yourself back to a regular diet.  5.  If you experience pain in your surgical area take Tylenol, or the pain medication prescribed by your doctor.  Some pain medications are very irritating when taken on an empty stomach, so try to take the medication wit a light meal or a glass of milk  6.  If you have any fever, chills, bleeding or uncontrollable pain or any other problems, notify your surgeon.      OTHER INSTRUCTIONS:    PAIN:     Tylenol or Motrin q 4-6 hours PRN, call if no relief  ________________________________________________________________________  ACTIVITY:    Take it easy 1-2 days  EXERCISE:    After 1 week.  ________________________________________________________________________  SEX, DOUCHING, TAMPONS None for 1 week  ________________________________________________________________________  TUB BATH, SWIMMING:  None for 1 week  APPOINTMENT:   Call for appointment 4 weeks.  ________________________________________________________________________  DRESSING:

## 2024-08-19 NOTE — ANESTHESIA PRE PROCEDURE
Department of Anesthesiology  Preprocedure Note       Name:  Inna Chan   Age:  38 y.o.  :  1985                                          MRN:  [id number]         Date:  2024      Surgeon: Surgeon(s):  Griffin Snyder MD    Procedure: Procedure(s):  DILATION AND CURETTAGE, VIDEO  HYSTEROSCOPY, ENDOMETRIAL ABLATION    Medications prior to admission:   Prior to Admission medications    Medication Sig Start Date End Date Taking? Authorizing Provider   Vitamin D, Ergocalciferol, 82962 units CAPS Take 1 capsule by mouth once a week sundays   Yes Chelle Collins MD   DHA-EPA-Vitamin E (OMEGA-3 COMPLEX PO) Take 1 capsule by mouth daily   Yes Chelle Collins MD   Psyllium (METAMUCIL) 48.57 % POWD Take by mouth   Yes Chelle Collins MD   buPROPion (WELLBUTRIN XL) 150 MG extended release tablet Take 1 tablet by mouth every morning    Chelle Collins MD   Prenatal MV-Min-Fe Fum-FA-DHA (PRENATAL 1 PO) Take 1 tablet by mouth daily    Chelle Collins MD   levothyroxine (SYNTHROID) 88 MCG tablet Take 175 mcg by mouth Daily    Chelle Collins MD       Current medications:    Current Facility-Administered Medications   Medication Dose Route Frequency Provider Last Rate Last Admin   • lidocaine PF 1 % injection 0.3 mL  0.3 mL IntraDERmal Once PRN Talha Arroyo MD       • lactated ringers IV soln infusion   IntraVENous Continuous Talha Arroyo MD       • sodium chloride flush 0.9 % injection 5-40 mL  5-40 mL IntraVENous 2 times per day Talha Arroyo MD       • sodium chloride flush 0.9 % injection 5-40 mL  5-40 mL IntraVENous PRN Talha Arroyo MD       • 0.9 % sodium chloride infusion   IntraVENous PRN Talha Arroyo MD           Allergies:    Allergies   Allergen Reactions   • Bactrim [Sulfamethoxazole-Trimethoprim] Rash   • Flagyl [Metronidazole] Rash       Problem List:    Patient Active Problem List   Diagnosis Code   • Right knee sprain S83.91XA

## 2025-04-30 ENCOUNTER — OFFICE VISIT (OUTPATIENT)
Dept: BARIATRICS/WEIGHT MGMT | Age: 40
End: 2025-04-30

## 2025-04-30 VITALS
RESPIRATION RATE: 18 BRPM | HEART RATE: 80 BPM | HEIGHT: 66 IN | OXYGEN SATURATION: 98 % | SYSTOLIC BLOOD PRESSURE: 138 MMHG | DIASTOLIC BLOOD PRESSURE: 86 MMHG | BODY MASS INDEX: 47.09 KG/M2 | WEIGHT: 293 LBS

## 2025-04-30 DIAGNOSIS — E66.01 MORBID OBESITY WITH BMI OF 45.0-49.9, ADULT (HCC): Primary | ICD-10-CM

## 2025-05-05 ENCOUNTER — TELEMEDICINE (OUTPATIENT)
Dept: BARIATRICS/WEIGHT MGMT | Age: 40
End: 2025-05-05
Payer: MEDICAID

## 2025-05-05 DIAGNOSIS — Z71.3 DIETARY COUNSELING AND SURVEILLANCE: ICD-10-CM

## 2025-05-05 DIAGNOSIS — E66.01 MORBID OBESITY WITH BMI OF 45.0-49.9, ADULT (HCC): Primary | ICD-10-CM

## 2025-05-05 PROCEDURE — 99204 OFFICE O/P NEW MOD 45 MIN: CPT | Performed by: FAMILY MEDICINE

## 2025-05-05 PROCEDURE — G2211 COMPLEX E/M VISIT ADD ON: HCPCS | Performed by: FAMILY MEDICINE

## 2025-05-05 PROCEDURE — G8427 DOCREV CUR MEDS BY ELIG CLIN: HCPCS | Performed by: FAMILY MEDICINE

## 2025-05-05 NOTE — PROGRESS NOTES
Patient: Inna Chan     Encounter Date: 5/5/2025    YOB: 1985               Age: 39 y.o.        Patient identification was verified at the start of the visit.         5/5/2025     8:38 AM   Patient-Reported Vitals   Patient-Reported Weight 299         BP Readings from Last 1 Encounters:   04/30/25 138/86       BMI Readings from Last 1 Encounters:   04/30/25 49.13 kg/m²       Pulse Readings from Last 1 Encounters:   04/30/25 80                                             Wt Readings from Last 3 Encounters:   04/30/25 (!) 138.1 kg (304 lb 6.4 oz)   08/14/24 (!) 140.2 kg (309 lb)   08/08/23 122.5 kg (270 lb)        Chief Complaint   Patient presents with    Bariatric, Initial Visit     MWM- NP        HPI:    39 y.o. female presents to establish care via video visit. The patient's medical history is significant for class III obesity. The patient has a long-standing history of obesity which started gradually. The problem is severe.  The patient has been gaining weight.  Risk factors include annual weight gain of >2 lbs (1 kg)/ year and sedentary lifestyle. Aggravating factors include poor diet and lack of physical activity. The patient has tried various diet/exercise plans which have been ineffective in the long-run. she is motivated to start losing weight to help improve her overall health.     When did you become overweight?  [] Childhood   [] Teens   [x] Adulthood   [] Pregnancy   [] Menopause    Highest adult weight: 309 pounds     Triggers for weight gain?   [] Stress   [] Illness   [] Medications   [] Travel  []Injury     [] Nightshift work   [] Insomnia  [] No specific triggers   [] Other    Food triggers:   [x] Stress   [x] Boredom   [] Fast food   [] Eating out   [] Seeking reward   [] Social     Have you ever taken prescription medications to help you lose weight?   [] Yes  [x] No    Have you ever been on a meal replacement program?  [] Yes  [x] No        Dietitian's assessment reviewed and

## (undated) DEVICE — Y-TYPE TUR/BLADDER IRRIGATION SET, REGULATING CLAMP

## (undated) DEVICE — KIT NOVASURE V5 THERMOABLATION DEVICE

## (undated) DEVICE — DRAPE,UNDERBUTTOCKS,PCH,STERILE: Brand: MEDLINE

## (undated) DEVICE — D&C: Brand: MEDLINE INDUSTRIES, INC.

## (undated) DEVICE — GLOVE ORANGE PI 7 1/2   MSG9075